# Patient Record
Sex: FEMALE | Race: WHITE | NOT HISPANIC OR LATINO | Employment: FULL TIME | URBAN - METROPOLITAN AREA
[De-identification: names, ages, dates, MRNs, and addresses within clinical notes are randomized per-mention and may not be internally consistent; named-entity substitution may affect disease eponyms.]

---

## 2018-07-26 ENCOUNTER — CLINICAL SUPPORT (OUTPATIENT)
Dept: FAMILY MEDICINE | Facility: CLINIC | Age: 18
End: 2018-07-26
Payer: COMMERCIAL

## 2018-07-26 DIAGNOSIS — Z11.1 PPD SCREENING TEST: Primary | ICD-10-CM

## 2018-07-26 PROCEDURE — 86580 TB INTRADERMAL TEST: CPT | Performed by: FAMILY MEDICINE

## 2018-10-31 ENCOUNTER — TELEPHONE (OUTPATIENT)
Dept: FAMILY MEDICINE | Facility: CLINIC | Age: 18
End: 2018-10-31

## 2019-03-21 ENCOUNTER — TELEPHONE (OUTPATIENT)
Dept: FAMILY MEDICINE | Facility: CLINIC | Age: 19
End: 2019-03-21

## 2019-03-21 NOTE — TELEPHONE ENCOUNTER
Pt calling, states Dr Gomez use to prescribe ondanestron 4mg for her for nausea due to being on cabergoline. States she hasnt needed it due to being off of this certain medication but is back on it and having the nausea again, would like to know if you can prescribe this ondanestron again for nausea. Would like sent to the [place] [id number] [address]

## 2019-03-21 NOTE — TELEPHONE ENCOUNTER
She hasn't been seen here in almost 2 years and needs an appt prior to prescription (and if she still considers me her [facility]) Either that or she could try getting the prescription for it from the prescriber of the cabergline.

## 2019-06-13 ENCOUNTER — TELEPHONE (OUTPATIENT)
Dept: FAMILY MEDICINE | Facility: CLINIC | Age: 19
End: 2019-06-13

## 2019-06-13 ENCOUNTER — OFFICE VISIT (OUTPATIENT)
Dept: FAMILY MEDICINE | Facility: CLINIC | Age: 19
End: 2019-06-13
Payer: COMMERCIAL

## 2019-06-13 VITALS
BODY MASS INDEX: 40.53 KG/M2 | HEART RATE: 89 BPM | OXYGEN SATURATION: 98 % | SYSTOLIC BLOOD PRESSURE: 110 MMHG | HEIGHT: 67 IN | TEMPERATURE: 98.4 F | DIASTOLIC BLOOD PRESSURE: 62 MMHG | WEIGHT: 258.2 LBS

## 2019-06-13 DIAGNOSIS — Z11.1 SCREENING FOR TUBERCULOSIS: ICD-10-CM

## 2019-06-13 DIAGNOSIS — Z00.00 GENERAL MEDICAL EXAM: Primary | ICD-10-CM

## 2019-06-13 DIAGNOSIS — E66.01 MORBID OBESITY WITH BODY MASS INDEX (BMI) OF 40.0 TO 44.9 IN ADULT (CMS/HCC): ICD-10-CM

## 2019-06-13 DIAGNOSIS — E22.1 HYPERPROLACTINEMIA (CMS/HCC): ICD-10-CM

## 2019-06-13 PROBLEM — L70.9 ACNE: Status: ACTIVE | Noted: 2019-06-13

## 2019-06-13 LAB
BILIRUBIN, POC: NEGATIVE
BLOOD URINE, POC: NEGATIVE
CLARITY, POC: CLEAR
COLOR, POC: YELLOW
GLUCOSE URINE, POC: NEGATIVE
KETONES, POC: NEGATIVE
LEUKOCYTE EST, POC: NEGATIVE
NITRITE, POC: NEGATIVE
PH, POC: 5
PROTEIN, POC: NEGATIVE
SPECIFIC GRAVITY, POC: 1.02

## 2019-06-13 PROCEDURE — 86580 TB INTRADERMAL TEST: CPT | Performed by: FAMILY MEDICINE

## 2019-06-13 PROCEDURE — 81002 URINALYSIS NONAUTO W/O SCOPE: CPT | Performed by: FAMILY MEDICINE

## 2019-06-13 PROCEDURE — 99395 PREV VISIT EST AGE 18-39: CPT | Mod: 25 | Performed by: FAMILY MEDICINE

## 2019-06-13 RX ORDER — CABERGOLINE 0.5 MG/1
0.25 TABLET ORAL
COMMUNITY
Start: 2018-10-19 | End: 2023-02-07 | Stop reason: ALTCHOICE

## 2019-06-13 RX ORDER — ONDANSETRON 4 MG/1
4 TABLET, FILM COATED ORAL
COMMUNITY
Start: 2017-08-09 | End: 2019-06-13 | Stop reason: SDUPTHER

## 2019-06-13 RX ORDER — ONDANSETRON 4 MG/1
4 TABLET, FILM COATED ORAL EVERY 8 HOURS PRN
Qty: 30 TABLET | Refills: 0 | Status: SHIPPED | OUTPATIENT
Start: 2019-06-13 | End: 2021-10-24

## 2019-06-13 ASSESSMENT — ENCOUNTER SYMPTOMS
HEADACHES: 1
DYSPHORIC MOOD: 0
ABDOMINAL PAIN: 0
ARTHRALGIAS: 0
SORE THROAT: 0
BRUISES/BLEEDS EASILY: 0
DYSURIA: 0
FATIGUE: 0
HEMATURIA: 0
EYE PAIN: 0
SHORTNESS OF BREATH: 0

## 2019-06-13 NOTE — TELEPHONE ENCOUNTER
Patient wanted Dr Gomez to know that     Dr Almonte [facility] was her endocrinologist    [phone]

## 2019-06-13 NOTE — ASSESSMENT & PLAN NOTE
Follows with endocrinologist ( Dr Swathi Almonte at [place]) every 6-12 months. On cabergoline

## 2019-06-13 NOTE — PROGRESS NOTES
[facility] at [facility]     Reason for visit:   Chief Complaint   Patient presents with   • Annual Exam      HPI  Feeling well overall. Eats a balanced diet. Does exercise: cardio and weight sets: tries for 5 days a  Week.  Sees endocrinologist (Dr Swathi Almonte) for hyperprolactinemia and is just starting back on cabergoline. It makes her nauseated and zofran helps. Needs zofran refill.  Sees gyn for routine exams ([facility]) last there 12/2018 or 1/2019. Will be having her first PAP later this year  She is working on weight loss: last time she went on cabergoline was able to lose 30 pounds and is planning on losing weight now that she is back on it    Not sexually active      Cintia Nieto is a 19 y.o. female      The following have been reviewed and updated as appropriate in this visit  Patient Active Problem List    Diagnosis Date Noted   • Acne 06/13/2019   • Hyperprolactinemia (CMS/HCC) ([facility]) 06/13/2019   • Morbid obesity with body mass index (BMI) of 40.0 to 44.9 in adult (CMS/[facility]) 06/13/2019     History reviewed. No pertinent past medical history.  History reviewed. No pertinent surgical history.  Social History     Social History   • Marital status: Single     Spouse name: N/A   • Number of children: N/A   • Years of education: N/A     Occupational History   • Not on file.     Social History Main Topics   • Smoking status: Never Smoker   • Smokeless tobacco: Never Used   • Alcohol use 1.2 - 1.8 oz/week     2 - 3 Shots of liquor per week      Comment: socailly -- hard seltzer    • Drug use: Unknown   • Sexual activity: Not on file     Other Topics Concern   • Not on file     Social History Narrative   • No narrative on file       Family History   Problem Relation Age of Onset   • Anxiety disorder Father    • Prostate cancer Father    • Skin cancer Father        No Known Allergies    Current Outpatient Prescriptions   Medication Sig Dispense Refill   • cabergoline (DOSTINEX) 0.5  "mg tablet Take 0.25 mg by mouth.     • ondansetron (ZOFRAN) 4 mg tablet Take 1 tablet (4 mg total) by mouth every 8 (eight) hours as needed for nausea or vomiting. 30 tablet 0     No current facility-administered medications for this visit.      Review of Systems   Constitutional: Negative for fatigue.   HENT: Positive for ear pain (occasional bilateral ear pain). Negative for sore throat.    Eyes: Negative for pain.   Respiratory: Negative for shortness of breath.    Cardiovascular: Negative for chest pain.   Gastrointestinal: Negative for abdominal pain.   Genitourinary: Negative for dysuria and hematuria.   Musculoskeletal: Negative for arthralgias.   Skin:        No changes in moles that she has noticed   Neurological: Positive for headaches (occasional. being on cabergoline helps).   Hematological: Does not bruise/bleed easily.   Psychiatric/Behavioral: Negative for dysphoric mood.        PHQ2 score=0     Objective   Vitals:    06/13/19 1316   BP: 110/62   Pulse: 89   Temp: 36.9 °C (98.4 °F)   SpO2: 98%   Weight: 117 kg (258 lb 3.2 oz)   Height: 1.702 m (5' 7\")     Body mass index is 40.44 kg/m².       Visual Acuity Screening    Right eye Left eye Both eyes   Without correction: 20/25 20/25 20/20   With correction:      Comments: (-) color blind     Physical Exam   Constitutional: She appears well-developed and well-nourished. No distress.   HENT:   Head: Normocephalic and atraumatic.   Right Ear: Tympanic membrane normal.   Left Ear: Tympanic membrane normal.   Mouth/Throat: Oropharynx is clear and moist.   External nose normal   Eyes: Pupils are equal, round, and reactive to light. EOM are normal.   Fundi benign   Neck: Neck supple. No thyromegaly present.   Cardiovascular: Normal rate and regular rhythm.  Exam reveals no gallop and no friction rub.    No murmur heard.  Pulmonary/Chest: Effort normal and breath sounds normal. She exhibits no deformity.   Breasts: no mass or axillary lymphadenopathy " bilaterally   Abdominal: Soft. Bowel sounds are normal. She exhibits no distension and no mass. There is no hepatosplenomegaly. There is no tenderness.   Musculoskeletal: She exhibits no edema.   Lymphadenopathy:     She has no cervical adenopathy.   Neurological: She is alert.   Reflex Scores:       Bicep reflexes are 2+ on the right side and 2+ on the left side.       Brachioradialis reflexes are 2+ on the right side and 2+ on the left side.       Patellar reflexes are 2+ on the right side and 2+ on the left side.  Skin: No cyanosis. Nails show no clubbing.   No suspicious lesions     Procedures       POINT OF CARE TESTING:    Results for orders placed or performed in visit on 06/13/19   POCT urinalysis dipstick   Result Value Ref Range    Color, UA Yellow     Clarity, UA Clear     Glucose, UA Negative     Bilirubin, UA Negative     Ketones, UA Negative     Spec Grav, UA 1.020     Blood, UA Negative     pH, UA 5.0     Protein, UA Negative     Leukocytes, UA Negative     Nitrite, UA Negative         Assessment   Diagnoses and all orders for this visit:    General medical exam (Primary)  Comments:  well pt. UA done in office. had lipids and other labs done by endocrinology per patient  Orders:  -     POCT urinalysis dipstick    Morbid obesity with body mass index (BMI) of 40.0 to 44.9 in adult (CMS/[facility])  Assessment & Plan:  Continue weight loss efforts. Should improve now that she is back on cabergoline      Hyperprolactinemia (CMS/HCC) ([facility])  Assessment & Plan:  Follows with endocrinologist ( Dr Swathi Almonte at [place]) every 6-12 months. On cabergoline      Screening for tuberculosis  Comments:  PPD placed. sister is a nurse (known to me) and will read it and send me documentation  Orders:  -     TB Skin Test    Other orders  -     ondansetron (ZOFRAN) 4 mg tablet; Take 1 tablet (4 mg total) by mouth every 8 (eight) hours as needed for nausea or vomiting.    She'll check on Tdap coverage     Educated patient on any  new medications/doses that I prescribed today and patient expressed understanding and patient expressed understanding of and agreement with plan  No Follow-up on file.     Anne Marie Gomez MD  6/13/2019

## 2019-07-09 ENCOUNTER — TELEPHONE (OUTPATIENT)
Dept: FAMILY MEDICINE | Facility: CLINIC | Age: 19
End: 2019-07-09

## 2019-07-09 NOTE — TELEPHONE ENCOUNTER
Called lmjenni letting pt know we received her ppd results form, and that physical form is completed. Need to know if she would like us to mail it to her or if she would like to pick it up?

## 2020-07-27 ENCOUNTER — APPOINTMENT (OUTPATIENT)
Dept: URBAN - METROPOLITAN AREA CLINIC 200 | Age: 20
Setting detail: DERMATOLOGY
End: 2020-08-04

## 2020-07-27 DIAGNOSIS — L73.2 HIDRADENITIS SUPPURATIVA: ICD-10-CM

## 2020-07-27 PROBLEM — L70.0 ACNE VULGARIS: Status: ACTIVE | Noted: 2020-07-27

## 2020-07-27 PROCEDURE — OTHER REASON FOR TELEMEDICINE VISIT: OTHER

## 2020-07-27 PROCEDURE — 99212 OFFICE O/P EST SF 10 MIN: CPT | Mod: 95

## 2020-07-27 PROCEDURE — OTHER PRESCRIPTION MEDICATION MANAGEMENT: OTHER

## 2020-07-27 PROCEDURE — OTHER CONSENT FOR TELEMEDICINE VISIT OBTAINED: OTHER

## 2020-07-27 PROCEDURE — OTHER PRESCRIPTION: OTHER

## 2020-07-27 PROCEDURE — OTHER TELEHEALTH ASSESSMENT: OTHER

## 2020-07-27 RX ORDER — DOXYCYCLINE HYCLATE 20 MG/1
TABLET, FILM COATED ORAL
Qty: 60 | Refills: 3 | Status: ERX | COMMUNITY
Start: 2020-07-27

## 2020-07-27 RX ORDER — DOXYCYCLINE HYCLATE 100 MG/1
CAPSULE, GELATIN COATED ORAL
Qty: 60 | Refills: 3 | Status: ERX | COMMUNITY
Start: 2020-07-27

## 2020-07-27 ASSESSMENT — LOCATION SIMPLE DESCRIPTION DERM
LOCATION SIMPLE: LEFT UPPER BACK
LOCATION SIMPLE: UPPER BACK
LOCATION SIMPLE: RIGHT FOREHEAD
LOCATION SIMPLE: CHEST

## 2020-07-27 ASSESSMENT — LOCATION DETAILED DESCRIPTION DERM
LOCATION DETAILED: RIGHT MEDIAL SUPERIOR CHEST
LOCATION DETAILED: RIGHT LATERAL FOREHEAD
LOCATION DETAILED: SUPERIOR THORACIC SPINE
LOCATION DETAILED: LEFT SUPERIOR MEDIAL UPPER BACK
LOCATION DETAILED: LEFT MEDIAL SUPERIOR CHEST
LOCATION DETAILED: RIGHT MEDIAL FOREHEAD

## 2020-07-27 ASSESSMENT — LOCATION ZONE DERM
LOCATION ZONE: FACE
LOCATION ZONE: TRUNK

## 2020-07-27 NOTE — PROCEDURE: PRESCRIPTION MEDICATION MANAGEMENT
Plan: Start DOXYCYCLINE 100 mg
Render In Strict Bullet Format?: No
Initiate Treatment: doxycycline hyclate 20 mg tablet \\nDOXYCYCLINE 100 mg \\nCLN WASH
Detail Level: Detailed

## 2021-10-24 ENCOUNTER — HOSPITAL ENCOUNTER (OUTPATIENT)
Facility: CLINIC | Age: 21
Discharge: HOME | End: 2021-10-24
Attending: FAMILY MEDICINE
Payer: COMMERCIAL

## 2021-10-24 VITALS
DIASTOLIC BLOOD PRESSURE: 70 MMHG | HEART RATE: 84 BPM | OXYGEN SATURATION: 100 % | TEMPERATURE: 98.4 F | SYSTOLIC BLOOD PRESSURE: 118 MMHG

## 2021-10-24 DIAGNOSIS — J02.9 VIRAL PHARYNGITIS: Primary | ICD-10-CM

## 2021-10-24 LAB — S PYO AG THROAT QL: NEGATIVE

## 2021-10-24 PROCEDURE — S9083 URGENT CARE CENTER GLOBAL: HCPCS | Performed by: FAMILY MEDICINE

## 2021-10-24 PROCEDURE — 99214 OFFICE O/P EST MOD 30 MIN: CPT | Performed by: FAMILY MEDICINE

## 2021-10-24 PROCEDURE — 87880 STREP A ASSAY W/OPTIC: CPT | Performed by: FAMILY MEDICINE

## 2021-10-24 RX ORDER — IBUPROFEN 600 MG/1
600 TABLET ORAL 3 TIMES DAILY PRN
Qty: 60 TABLET | Refills: 0 | Status: SHIPPED | OUTPATIENT
Start: 2021-10-24 | End: 2023-02-07 | Stop reason: ALTCHOICE

## 2021-10-24 RX ORDER — FLUTICASONE PROPIONATE 50 MCG
1 SPRAY, SUSPENSION (ML) NASAL DAILY
Qty: 16 G | Refills: 0 | Status: SHIPPED | OUTPATIENT
Start: 2021-10-24 | End: 2025-01-08

## 2021-10-24 ASSESSMENT — ENCOUNTER SYMPTOMS
SINUS PRESSURE: 0
SINUS PAIN: 0
CHILLS: 0
TROUBLE SWALLOWING: 0
FATIGUE: 0
SHORTNESS OF BREATH: 0
FEVER: 0
WHEEZING: 0
RHINORRHEA: 1
SORE THROAT: 1
COUGH: 0

## 2021-10-24 NOTE — ED PROVIDER NOTES
History  Chief Complaint   Patient presents with   • Sore Throat     HPI     Sore throat for 5- 7days  Has seasonal allergies - takes daily zyrtec  No fever/chills   - had a few kids out this past week - all viral non covid  Fully vaccinated for covid  Sore throat  No ear pain  Minimal post nasal drip  Has tried cough medicines to help numb the back with little relief  Ibuprofen with temporary relief  Worse at night when lying down      History reviewed. No pertinent past medical history.    History reviewed. No pertinent surgical history.    Family History   Problem Relation Age of Onset   • Anxiety disorder Biological Father    • Prostate cancer Biological Father    • Skin cancer Biological Father        Social History     Tobacco Use   • Smoking status: Never Smoker   • Smokeless tobacco: Never Used   Substance Use Topics   • Alcohol use: Yes     Alcohol/week: 2.0 - 3.0 standard drinks     Types: 2 - 3 Shots of liquor per week     Comment: socailly -- hard seltzer    • Drug use: Not on file       Review of Systems   Constitutional: Negative for chills, fatigue and fever.   HENT: Positive for congestion, postnasal drip, rhinorrhea and sore throat. Negative for ear pain, sinus pressure, sinus pain and trouble swallowing.    Respiratory: Negative for cough, shortness of breath and wheezing.    Cardiovascular: Negative for chest pain.       Physical Exam  ED Triage Vitals [10/24/21 1225]   Temp Heart Rate Resp BP SpO2   36.9 °C (98.4 °F) 84 -- 118/70 100 %      Temp src Heart Rate Source Patient Position BP Location FiO2 (%) (Set)   -- -- Sitting Right upper arm --       Physical Exam  Vitals and nursing note reviewed.   Constitutional:       General: She is not in acute distress.     Appearance: She is well-developed.   HENT:      Head: Normocephalic and atraumatic.      Right Ear: Tympanic membrane and ear canal normal. No tenderness. No middle ear effusion. Tympanic membrane is not erythematous.       Left Ear: Tympanic membrane and ear canal normal. No tenderness.  No middle ear effusion. Tympanic membrane is not erythematous.      Nose: Congestion and rhinorrhea present.      Comments: Turbinates with clear discharge and moderate enlargement and erythema  Negative tenderness to palpation over the sinuses       Mouth/Throat:      Mouth: Mucous membranes are moist.      Pharynx: No oropharyngeal exudate or posterior oropharyngeal erythema.   Eyes:      Conjunctiva/sclera: Conjunctivae normal.   Cardiovascular:      Rate and Rhythm: Normal rate and regular rhythm.      Heart sounds: No murmur heard.  No gallop.    Pulmonary:      Effort: Pulmonary effort is normal. No respiratory distress.      Breath sounds: Normal breath sounds. No wheezing.   Abdominal:      Palpations: Abdomen is soft.      Tenderness: There is no abdominal tenderness.   Musculoskeletal:      Cervical back: Neck supple.   Skin:     General: Skin is warm and dry.   Neurological:      General: No focal deficit present.      Mental Status: She is alert and oriented to person, place, and time.   Psychiatric:         Mood and Affect: Mood normal.         Behavior: Behavior normal.           Procedures  Procedures    UC Course  Clinical Impressions as of 10/24/21 1243   Viral pharyngitis       MDM    Viral pharyngitis  Cont supportive care  Cont zyrtec  Add fluticasone  Ibuprofen  Increase water and rest  Low suspicion for COVID  Strep negative             Faith Howell,   10/24/21 1244

## 2021-12-15 RX ORDER — FLUTICASONE PROPIONATE 50 MCG
SPRAY, SUSPENSION (ML) NASAL
Qty: 16 ML | OUTPATIENT
Start: 2021-12-15

## 2022-08-25 ENCOUNTER — OFFICE VISIT (OUTPATIENT)
Dept: FAMILY MEDICINE | Facility: CLINIC | Age: 22
End: 2022-08-25
Payer: COMMERCIAL

## 2022-08-25 VITALS
HEIGHT: 67 IN | WEIGHT: 278.4 LBS | SYSTOLIC BLOOD PRESSURE: 118 MMHG | DIASTOLIC BLOOD PRESSURE: 70 MMHG | BODY MASS INDEX: 43.7 KG/M2 | HEART RATE: 82 BPM | RESPIRATION RATE: 14 BRPM | OXYGEN SATURATION: 97 %

## 2022-08-25 DIAGNOSIS — Z11.1 TUBERCULOSIS SCREENING: ICD-10-CM

## 2022-08-25 DIAGNOSIS — Z23 ENCOUNTER FOR IMMUNIZATION: ICD-10-CM

## 2022-08-25 DIAGNOSIS — Z00.00 ANNUAL PHYSICAL EXAM: Primary | ICD-10-CM

## 2022-08-25 PROBLEM — E22.1 HYPERPROLACTINEMIA (CMS/HCC): Status: RESOLVED | Noted: 2019-06-13 | Resolved: 2022-08-25

## 2022-08-25 PROCEDURE — 99395 PREV VISIT EST AGE 18-39: CPT | Mod: 25 | Performed by: NURSE PRACTITIONER

## 2022-08-25 PROCEDURE — 90471 IMMUNIZATION ADMIN: CPT | Performed by: NURSE PRACTITIONER

## 2022-08-25 PROCEDURE — 90715 TDAP VACCINE 7 YRS/> IM: CPT | Performed by: NURSE PRACTITIONER

## 2022-08-25 PROCEDURE — 86580 TB INTRADERMAL TEST: CPT | Performed by: NURSE PRACTITIONER

## 2022-08-25 PROCEDURE — 3008F BODY MASS INDEX DOCD: CPT | Performed by: NURSE PRACTITIONER

## 2022-08-25 ASSESSMENT — ENCOUNTER SYMPTOMS
FEVER: 0
CARDIOVASCULAR NEGATIVE: 1
CONSTIPATION: 0
EYE REDNESS: 0
SORE THROAT: 0
SEIZURES: 0
MYALGIAS: 0
RESPIRATORY NEGATIVE: 1
EYE DISCHARGE: 0
CONFUSION: 0
DYSURIA: 0
FREQUENCY: 0
PALPITATIONS: 0
NEUROLOGICAL NEGATIVE: 1
MUSCULOSKELETAL NEGATIVE: 1
EYES NEGATIVE: 1
CONSTITUTIONAL NEGATIVE: 1
DIARRHEA: 0
DIZZINESS: 0
GASTROINTESTINAL NEGATIVE: 1
WHEEZING: 0
APPETITE CHANGE: 0
SHORTNESS OF BREATH: 0
FLANK PAIN: 0
NAUSEA: 0
RHINORRHEA: 0
ALLERGIC/IMMUNOLOGIC NEGATIVE: 1
NECK PAIN: 0
FATIGUE: 0
PSYCHIATRIC NEGATIVE: 1
CHEST TIGHTNESS: 0
ABDOMINAL PAIN: 0
ENDOCRINE NEGATIVE: 1
ADENOPATHY: 0
HEMATOLOGIC/LYMPHATIC NEGATIVE: 1
HEADACHES: 0
POLYDIPSIA: 0
ARTHRALGIAS: 0
COUGH: 0
POLYPHAGIA: 0

## 2022-08-25 NOTE — PROGRESS NOTES
[facility] at [facility]     Reason for visit:   Chief Complaint   Patient presents with   • Re-establish care     Physical       HPI  Cintia Nieto is a 22 y.o. female is re-establishing care. LOV June 2019    Health Maintenance    Last Pap: due   Most Recent Labs: not recent   Covid: J&J - no booster   Last Tetanus/Tdap: 8/24/2009  Last eye exam: 2 yrs ago   Last dentist appt: every six months   Diet: eats healthy with lots of fruit and veges   Exercise: walks about 4x/ week     Offering no problems and concerns.       Review of Systems   Constitutional: Negative.  Negative for appetite change, fatigue and fever.   HENT: Negative.  Negative for congestion, ear pain, rhinorrhea and sore throat.    Eyes: Negative.  Negative for discharge, redness and visual disturbance.   Respiratory: Negative.  Negative for cough, chest tightness, shortness of breath and wheezing.    Cardiovascular: Negative.  Negative for chest pain, palpitations and leg swelling.   Gastrointestinal: Negative.  Negative for abdominal pain, constipation, diarrhea and nausea.   Endocrine: Negative.  Negative for polydipsia, polyphagia and polyuria.   Genitourinary: Negative.  Negative for dysuria, flank pain, frequency and urgency.   Musculoskeletal: Negative.  Negative for arthralgias, myalgias and neck pain.   Skin: Negative.  Negative for pallor and rash.   Allergic/Immunologic: Negative.  Negative for immunocompromised state.   Neurological: Negative.  Negative for dizziness, seizures and headaches.   Hematological: Negative.  Negative for adenopathy.   Psychiatric/Behavioral: Negative.  Negative for behavioral problems and confusion.        Patient Active Problem List   Diagnosis   • Acne   • Morbid obesity with body mass index (BMI) of 40.0 to 44.9 in adult (CMS/[facility])         History reviewed. No pertinent past medical history.  History reviewed. No pertinent surgical history.  Social History     Socioeconomic History  "  • Marital status: Single     Spouse name: Not on file   • Number of children: Not on file   • Years of education: Not on file   • Highest education level: Not on file   Occupational History   • Not on file   Tobacco Use   • Smoking status: Never Smoker   • Smokeless tobacco: Never Used   Substance and Sexual Activity   • Alcohol use: Yes     Alcohol/week: 2.0 - 3.0 standard drinks     Types: 2 - 3 Shots of liquor per week     Comment: socailly -- hard seltzer    • Drug use: Not on file   • Sexual activity: Not on file   Other Topics Concern   • Not on file   Social History Narrative   • Not on file     Social Determinants of Health     Financial Resource Strain: Not on file   Food Insecurity: Not on file   Transportation Needs: Not on file   Physical Activity: Not on file   Stress: Not on file   Social Connections: Not on file   Intimate Partner Violence: Not on file   Housing Stability: Not on file     Family History   Problem Relation Age of Onset   • Anxiety disorder Biological Father    • Prostate cancer Biological Father    • Skin cancer Biological Father    • Ventricular tachycardia Biological Sister        Allergies:  Patient has no known allergies.    Current Outpatient Medications   Medication Sig Dispense Refill   • cabergoline (DOSTINEX) 0.5 mg tablet Take 0.25 mg by mouth.     • fluticasone propionate 50 mcg/actuation nasal spray Administer 1 spray into each nostril daily. (Patient not taking: Reported on 8/25/2022) 16 g 0   • ibuprofen 600 mg tablet Take 1 tablet (600 mg total) by mouth 3 (three) times a day as needed for moderate pain for up to 10 days. Take with food 60 tablet 0     No current facility-administered medications for this visit.        Objective   Vitals:    08/25/22 1605   BP: 118/70   Pulse: 82   Resp: 14   SpO2: 97%   Weight: 126 kg (278 lb 6.4 oz)   Height: 1.702 m (5' 7\")     Ht Readings from Last 3 Encounters:   08/25/22 1.702 m (5' 7\")   06/13/19 1.702 m (5' 7\")     Wt Readings " from Last 3 Encounters:   08/25/22 126 kg (278 lb 6.4 oz)   06/13/19 117 kg (258 lb 3.2 oz)     Body mass index is 43.6 kg/m².     Hearing Screening    125Hz 250Hz 500Hz 1000Hz 2000Hz 3000Hz 4000Hz 6000Hz 8000Hz   Right ear:            Left ear:               Visual Acuity Screening    Right eye Left eye Both eyes   Without correction: 20/20 20/25 20/15   With correction:      Comments: - color blind       Physical Exam  Constitutional:       Appearance: Normal appearance. She is well-developed.   HENT:      Head: Normocephalic.      Right Ear: Hearing, tympanic membrane, ear canal and external ear normal.      Left Ear: Hearing, tympanic membrane, ear canal and external ear normal.   Eyes:      General: Lids are normal.      Conjunctiva/sclera: Conjunctivae normal.      Pupils: Pupils are equal, round, and reactive to light.   Neck:      Thyroid: No thyroid mass or thyromegaly.      Vascular: No carotid bruit.      Trachea: Trachea normal.   Cardiovascular:      Rate and Rhythm: Normal rate and regular rhythm.      Pulses:           Radial pulses are 2+ on the right side and 2+ on the left side.        Dorsalis pedis pulses are 2+ on the right side and 2+ on the left side.      Heart sounds: Normal heart sounds, S1 normal and S2 normal.   Pulmonary:      Effort: Pulmonary effort is normal.      Breath sounds: Normal breath sounds.   Abdominal:      General: Bowel sounds are normal.      Palpations: Abdomen is soft.      Tenderness: There is no abdominal tenderness.   Musculoskeletal:         General: Normal range of motion.      Cervical back: Normal range of motion.   Lymphadenopathy:      Cervical: No cervical adenopathy.   Skin:     General: Skin is warm and dry.   Neurological:      Mental Status: She is alert and oriented to person, place, and time.      GCS: GCS eye subscore is 4. GCS verbal subscore is 5. GCS motor subscore is 6.      Cranial Nerves: No cranial nerve deficit.      Sensory: No sensory  deficit.   Psychiatric:         Speech: Speech normal.         Behavior: Behavior normal. Behavior is cooperative.              Point of Care Testing Results  No results found for this visit on 08/25/22.     Assessment   Diagnoses and all orders for this visit:    Annual physical exam (Primary)    Encounter for immunization  -     Tdap vaccine greater than or equal to 6yo IM    Tuberculosis screening  -     TB Skin Test    Stable annual wellness  Recommend follow up with gyn as scheduled  Recommend routine eye and dental care         Patient has been educated on the risks, benefits, and side effects of all medication prescribed at this visit.  Patient expressed understanding and agreement with plan.  Return in about 1 year (around 8/25/2023).  ARNULFO Villarreal  8/25/2022       There are no Patient Instructions on file for this visit.

## 2022-08-26 ENCOUNTER — TELEPHONE (OUTPATIENT)
Dept: FAMILY MEDICINE | Facility: CLINIC | Age: 22
End: 2022-08-26

## 2022-08-26 NOTE — TELEPHONE ENCOUNTER
Pt's mother called asking for clarification on $80 NPV charge, as she believed it would be covered by ins. Please call pt's mother as able. Thank you.

## 2023-02-07 ENCOUNTER — OFFICE VISIT (OUTPATIENT)
Dept: FAMILY MEDICINE | Facility: CLINIC | Age: 23
End: 2023-02-07
Payer: COMMERCIAL

## 2023-02-07 VITALS
BODY MASS INDEX: 43.17 KG/M2 | HEART RATE: 103 BPM | WEIGHT: 275.6 LBS | DIASTOLIC BLOOD PRESSURE: 78 MMHG | RESPIRATION RATE: 15 BRPM | SYSTOLIC BLOOD PRESSURE: 120 MMHG | OXYGEN SATURATION: 98 %

## 2023-02-07 DIAGNOSIS — B35.4 TINEA CORPORIS: ICD-10-CM

## 2023-02-07 DIAGNOSIS — D35.2 PITUITARY ADENOMA (CMS/HCC): ICD-10-CM

## 2023-02-07 DIAGNOSIS — R51.9 HEADACHE, UNSPECIFIED HEADACHE TYPE: Primary | ICD-10-CM

## 2023-02-07 PROCEDURE — 99214 OFFICE O/P EST MOD 30 MIN: CPT | Performed by: FAMILY MEDICINE

## 2023-02-07 PROCEDURE — 3008F BODY MASS INDEX DOCD: CPT | Performed by: FAMILY MEDICINE

## 2023-02-07 RX ORDER — MELOXICAM 15 MG/1
15 TABLET ORAL DAILY
Qty: 15 TABLET | Refills: 0 | Status: SHIPPED | OUTPATIENT
Start: 2023-02-07 | End: 2025-01-08

## 2023-02-07 RX ORDER — NYSTATIN 100000 [USP'U]/G
POWDER TOPICAL 2 TIMES DAILY
Qty: 60 G | Refills: 1 | Status: SHIPPED | OUTPATIENT
Start: 2023-02-07 | End: 2023-03-09

## 2023-02-07 ASSESSMENT — ENCOUNTER SYMPTOMS
SHORTNESS OF BREATH: 0
NECK PAIN: 1
FEVER: 0
HEADACHES: 1
ABDOMINAL PAIN: 0

## 2023-02-07 NOTE — PROGRESS NOTES
[facility] at [facility]     Reason for visit:   Chief Complaint   Patient presents with   • Migraine     Patient states for the past two weeks, concentrated on the left side, but also dull through the whole head.   • Rash     Under bi-lateral breast, believes its a yeast infection.      HPI  She has had headaches for years but for the past 2 weeks she has had a different kind of headaches. She gets a left sided headache shooting pain from the left occiput up over the top of her head. Her headache is worse with bright light. The shooting pain is intermittent the headache is constant but waxes and waned in severity.  No known tick bites    Yesterday she developed a rash under both breasts. It doesn't itch but it is uncomfortable.     No chance pregnant    Cintia Nieto is a 22 y.o. female      The following have been reviewed and updated as appropriate in this visit  Patient Active Problem List    Diagnosis Date Noted   • Pituitary adenoma (CMS/[facility]) 02/07/2023   • Acne 06/13/2019   • Morbid obesity with body mass index (BMI) of 40.0 to 44.9 in adult (CMS/[facility]) 06/13/2019     History reviewed. No pertinent past medical history.  History reviewed. No pertinent surgical history.  Social History     Socioeconomic History   • Marital status: Single     Spouse name: Not on file   • Number of children: Not on file   • Years of education: Not on file   • Highest education level: Not on file   Occupational History   • Not on file   Tobacco Use   • Smoking status: Never   • Smokeless tobacco: Never   Substance and Sexual Activity   • Alcohol use: Yes     Alcohol/week: 2.0 - 3.0 standard drinks     Types: 2 - 3 Shots of liquor per week     Comment: socailly -- hard seltzer    • Drug use: Not on file   • Sexual activity: Not on file   Other Topics Concern   • Not on file   Social History Narrative   • Not on file     Social Determinants of Health     Financial Resource Strain: Not on file   Food Insecurity:  Not on file   Transportation Needs: Not on file   Physical Activity: Not on file   Stress: Not on file   Social Connections: Not on file   Intimate Partner Violence: Not on file   Housing Stability: Not on file       Family History   Problem Relation Age of Onset   • Anxiety disorder Biological Father    • Prostate cancer Biological Father    • Skin cancer Biological Father    • Ventricular tachycardia Biological Sister        No Known Allergies    Current Outpatient Medications   Medication Sig Dispense Refill   • fluticasone propionate 50 mcg/actuation nasal spray Administer 1 spray into each nostril daily. 16 g 0   • meloxicam (MOBIC) 15 mg tablet Take 1 tablet (15 mg total) by mouth daily for 15 days. with food 15 tablet 0   • nystatin (MYCOSTATIN) 100,000 unit/gram powder Apply topically 2 (two) times a day. 60 g 1     No current facility-administered medications for this visit.     Review of Systems   Constitutional: Negative for fever.   Respiratory: Negative for shortness of breath.    Cardiovascular: Negative for chest pain.        Occasional palpitations near menses. No lightheadedness   Gastrointestinal: Negative for abdominal pain.   Genitourinary: Negative for decreased urine volume.   Musculoskeletal: Positive for neck pain.   Skin: Positive for rash.   Neurological: Positive for headaches.     Objective   Vitals:    02/07/23 1435   BP: 120/78   BP Location: Left upper arm   Patient Position: Sitting   Pulse: (!) 103   Resp: 15   SpO2: 98%   Weight: 125 kg (275 lb 9.6 oz)     Body mass index is 43.17 kg/m².    Physical Exam  Constitutional:       General: She is not in acute distress.     Appearance: She is well-developed.   HENT:      Head: Normocephalic and atraumatic.      Right Ear: Tympanic membrane and ear canal normal.      Left Ear: Tympanic membrane and ear canal normal.   Eyes:      General: Lids are normal.      Extraocular Movements: Extraocular movements intact.      Conjunctiva/sclera:  Conjunctivae normal.      Pupils: Pupils are equal, round, and reactive to light.      Comments: Fundi benign   Neck:      Thyroid: No thyromegaly.   Cardiovascular:      Rate and Rhythm: Normal rate and regular rhythm.      Heart sounds: Normal heart sounds. No murmur heard.    No friction rub. No gallop.   Pulmonary:      Effort: Pulmonary effort is normal.      Breath sounds: Normal breath sounds.   Abdominal:      Palpations: Abdomen is soft.      Tenderness: There is no abdominal tenderness.   Musculoskeletal:      Right lower leg: No edema.      Left lower leg: No edema.   Lymphadenopathy:      Cervical: No cervical adenopathy.   Skin:     Coloration: Skin is not cyanotic.      Nails: There is no clubbing.      Comments: Moist erythematous rash under both breasts in skin folds   Neurological:      Mental Status: She is alert.      Comments: Cranial nerves II-XI grossly intact       Procedures       POINT OF CARE TESTING:    No results found for this visit on 02/07/23.     Assessment   Diagnoses and all orders for this visit:    Headache, unspecified headache type (Primary)  Comments:  check labs as below. Meloxicam 15 mg once a day  Orders:  -     CBC and Differential; Future  -     Comprehensive metabolic panel; Future  -     Lyme Disease Abs, Immunoblot; Future    Tinea corporis  Comments:  nystatin powder as below    Pituitary adenoma (CMS/HCC)  Assessment & Plan:  She hasn't seen endocrinology in some time. Check prolactin level    Orders:  -     Prolactin; Future    Other orders  -     meloxicam (MOBIC) 15 mg tablet; Take 1 tablet (15 mg total) by mouth daily for 15 days. with food  -     nystatin (MYCOSTATIN) 100,000 unit/gram powder; Apply topically 2 (two) times a day.       Educated patient on any new medications/doses that I prescribed today and patient expressed understanding and patient expressed understanding of and agreement with plan  No follow-ups on file.     Anne Marie Gomez MD  2/7/2023

## 2024-06-18 ENCOUNTER — TELEPHONE (OUTPATIENT)
Dept: FAMILY MEDICINE | Facility: CLINIC | Age: 24
End: 2024-06-18
Payer: COMMERCIAL

## 2024-06-18 NOTE — TELEPHONE ENCOUNTER
Per our records, pt overdue for EPP and cervical CA screening.     Spoke with pt and advised. Pt states her GYN office closed and she's booked with a new office for Sept. She states she will cb to schedule her EPP with Dr. Gomez.

## 2024-09-03 ENCOUNTER — TELEPHONE (OUTPATIENT)
Dept: FAMILY MEDICINE | Facility: CLINIC | Age: 24
End: 2024-09-03
Payer: COMMERCIAL

## 2024-10-31 ENCOUNTER — PATIENT OUTREACH (OUTPATIENT)
Dept: FAMILY MEDICINE | Facility: CLINIC | Age: 24
End: 2024-10-31
Payer: COMMERCIAL

## 2024-10-31 NOTE — PROGRESS NOTES
Care Gap Team has outreached to Cintia Nieto on behalf of their primary care provider.      Care Gap Source: [facility]    Care Gap(s) Identified: Cervical Cancer Screening    Care Gap Status: Due    Outreach via: Telephone    Adult Preventive Wellness Protocol(s) Used: Cervical Cancer Screening    Chart Review Completed: Yes  Patient interview completed: No  Inclusion Criteria: Met  Exclusion Criteria: None  Patient educated on recommended care: No    Order or Clinical Referral: None          Appointment provided: No                Called and left VM for patient letting them know that they are due for their cervical cancer screening. Msg in the summer states that pt was finding a new ob/gyn and scheduled for Sept. I am trying to see where pt went to to have screening completed.  Asked patient to return my call.

## 2025-01-08 ENCOUNTER — HOSPITAL ENCOUNTER (EMERGENCY)
Facility: HOSPITAL | Age: 25
Discharge: HOME | End: 2025-01-08
Attending: EMERGENCY MEDICINE | Admitting: EMERGENCY MEDICINE
Payer: COMMERCIAL

## 2025-01-08 ENCOUNTER — APPOINTMENT (EMERGENCY)
Dept: RADIOLOGY | Facility: HOSPITAL | Age: 25
End: 2025-01-08
Payer: COMMERCIAL

## 2025-01-08 VITALS
HEIGHT: 68 IN | HEART RATE: 102 BPM | TEMPERATURE: 96.8 F | BODY MASS INDEX: 44.41 KG/M2 | OXYGEN SATURATION: 100 % | WEIGHT: 293 LBS | SYSTOLIC BLOOD PRESSURE: 133 MMHG | RESPIRATION RATE: 18 BRPM | DIASTOLIC BLOOD PRESSURE: 83 MMHG

## 2025-01-08 DIAGNOSIS — R51.9 ACUTE NONINTRACTABLE HEADACHE, UNSPECIFIED HEADACHE TYPE: Primary | ICD-10-CM

## 2025-01-08 DIAGNOSIS — R42 DIZZINESS: ICD-10-CM

## 2025-01-08 LAB
ALBUMIN SERPL-MCNC: 4.5 G/DL (ref 3.5–5.7)
ALP SERPL-CCNC: 56 IU/L (ref 34–125)
ALT SERPL-CCNC: 19 IU/L (ref 7–52)
ANION GAP SERPL CALC-SCNC: 8 MEQ/L (ref 3–15)
AST SERPL-CCNC: 15 IU/L (ref 13–39)
BACTERIA URNS QL MICRO: ABNORMAL /HPF
BASOPHILS # BLD: 0.03 K/UL (ref 0.01–0.1)
BASOPHILS NFR BLD: 0.4 %
BILIRUB SERPL-MCNC: 0.5 MG/DL (ref 0.3–1.2)
BILIRUB UR QL STRIP.AUTO: NEGATIVE MG/DL
BUN SERPL-MCNC: 11 MG/DL (ref 7–25)
CALCIUM SERPL-MCNC: 9.9 MG/DL (ref 8.6–10.3)
CHLORIDE SERPL-SCNC: 100 MEQ/L (ref 98–107)
CLARITY UR REFRACT.AUTO: CLEAR
CO2 SERPL-SCNC: 28 MEQ/L (ref 21–31)
COLOR UR AUTO: YELLOW
CREAT SERPL-MCNC: 0.8 MG/DL (ref 0.6–1.2)
DIFFERENTIAL METHOD BLD: NORMAL
EGFRCR SERPLBLD CKD-EPI 2021: >60 ML/MIN/1.73M*2
EOSINOPHIL # BLD: 0.06 K/UL (ref 0.04–0.36)
EOSINOPHIL NFR BLD: 0.9 %
ERYTHROCYTE [DISTWIDTH] IN BLOOD BY AUTOMATED COUNT: 12.6 % (ref 11.7–14.4)
FLUAV RNA SPEC QL NAA+PROBE: NEGATIVE
FLUBV RNA SPEC QL NAA+PROBE: NEGATIVE
GLUCOSE SERPL-MCNC: 105 MG/DL (ref 70–99)
GLUCOSE UR STRIP.AUTO-MCNC: NEGATIVE MG/DL
HCG UR QL: NEGATIVE
HCT VFR BLD AUTO: 42.5 % (ref 35–45)
HGB BLD-MCNC: 14.3 G/DL (ref 11.8–15.7)
HGB UR QL STRIP.AUTO: NEGATIVE
HYALINE CASTS #/AREA URNS LPF: ABNORMAL /LPF
IMM GRANULOCYTES # BLD AUTO: 0.02 K/UL (ref 0–0.08)
IMM GRANULOCYTES NFR BLD AUTO: 0.3 %
KETONES UR STRIP.AUTO-MCNC: NEGATIVE MG/DL
LEUKOCYTE ESTERASE UR QL STRIP.AUTO: NEGATIVE
LYMPHOCYTES # BLD: 1.41 K/UL (ref 1.2–3.5)
LYMPHOCYTES NFR BLD: 20.5 %
MCH RBC QN AUTO: 29.2 PG (ref 28–33.2)
MCHC RBC AUTO-ENTMCNC: 33.6 G/DL (ref 32.2–35.5)
MCV RBC AUTO: 86.7 FL (ref 83–98)
MONOCYTES # BLD: 0.42 K/UL (ref 0.28–0.8)
MONOCYTES NFR BLD: 6.1 %
MUCOUS THREADS URNS QL MICRO: ABNORMAL /LPF
NEUTROPHILS # BLD: 4.95 K/UL (ref 1.7–7)
NEUTS SEG NFR BLD: 71.8 %
NITRITE UR QL STRIP.AUTO: NEGATIVE
NRBC BLD-RTO: 0 %
PH UR STRIP.AUTO: 7 [PH]
PLATELET # BLD AUTO: 255 K/UL (ref 150–369)
PMV BLD AUTO: 9.8 FL (ref 9.4–12.3)
POTASSIUM SERPL-SCNC: 3.8 MEQ/L (ref 3.5–5.1)
PROT SERPL-MCNC: 8.3 G/DL (ref 6–8.2)
PROT UR QL STRIP.AUTO: ABNORMAL
RBC # BLD AUTO: 4.9 M/UL (ref 3.93–5.22)
RBC #/AREA URNS HPF: ABNORMAL /HPF
RSV RNA SPEC QL NAA+PROBE: NEGATIVE
SARS-COV-2 RNA RESP QL NAA+PROBE: NEGATIVE
SODIUM SERPL-SCNC: 136 MEQ/L (ref 136–145)
SP GR UR REFRACT.AUTO: 1.02
SQUAMOUS URNS QL MICRO: ABNORMAL /HPF
UROBILINOGEN UR STRIP-ACNC: 0.2 EU/DL
WBC # BLD AUTO: 6.89 K/UL (ref 3.8–10.5)
WBC #/AREA URNS HPF: ABNORMAL /HPF

## 2025-01-08 PROCEDURE — 36415 COLL VENOUS BLD VENIPUNCTURE: CPT | Performed by: EMERGENCY MEDICINE

## 2025-01-08 PROCEDURE — 99284 EMERGENCY DEPT VISIT MOD MDM: CPT | Mod: 25

## 2025-01-08 PROCEDURE — 87637 SARSCOV2&INF A&B&RSV AMP PRB: CPT | Performed by: EMERGENCY MEDICINE

## 2025-01-08 PROCEDURE — 80053 COMPREHEN METABOLIC PANEL: CPT | Performed by: EMERGENCY MEDICINE

## 2025-01-08 PROCEDURE — 81001 URINALYSIS AUTO W/SCOPE: CPT | Performed by: EMERGENCY MEDICINE

## 2025-01-08 PROCEDURE — 70450 CT HEAD/BRAIN W/O DYE: CPT

## 2025-01-08 PROCEDURE — 84703 CHORIONIC GONADOTROPIN ASSAY: CPT | Performed by: EMERGENCY MEDICINE

## 2025-01-08 PROCEDURE — 85025 COMPLETE CBC W/AUTO DIFF WBC: CPT | Performed by: EMERGENCY MEDICINE

## 2025-01-08 RX ORDER — MECLIZINE HCL 25MG 25 MG/1
25 TABLET, CHEWABLE ORAL 3 TIMES DAILY PRN
Qty: 20 TABLET | Refills: 0 | Status: SHIPPED | OUTPATIENT
Start: 2025-01-08 | End: 2025-03-31

## 2025-01-08 RX ORDER — BUTALBITAL, ACETAMINOPHEN AND CAFFEINE 50; 325; 40 MG/1; MG/1; MG/1
1 TABLET ORAL EVERY 4 HOURS PRN
Qty: 12 TABLET | Refills: 0 | Status: SHIPPED | OUTPATIENT
Start: 2025-01-08 | End: 2025-03-31

## 2025-01-08 ASSESSMENT — ENCOUNTER SYMPTOMS
CHEST TIGHTNESS: 0
VOMITING: 0
FLANK PAIN: 0
FEVER: 0
MYALGIAS: 1
ABDOMINAL PAIN: 0
FATIGUE: 0
DYSURIA: 0
COUGH: 0
NAUSEA: 1
SHORTNESS OF BREATH: 0
WEAKNESS: 0
HEADACHES: 1
NECK PAIN: 1
SEIZURES: 0

## 2025-01-08 NOTE — Clinical Note
Cintia Nieto was seen and treated in our emergency department on 1/8/2025.  Cintia Nieto may return to work on 01/10/2025.       If you have any questions or concerns, please don't hesitate to call.      Nora Ugalde PA

## 2025-01-08 NOTE — DISCHARGE INSTRUCTIONS
Your workup is largely reassuring.  You tested negative for flu, COVID, RSV.  Take Antivert as prescribed to help with the dizziness.  You were also prescribed Fioricet to take for the headache as needed.  Follow-up with your family doctor, your endocrinologist and with neurology if you have continued headaches.  Return to the emergency department for any worsening symptoms

## 2025-01-08 NOTE — ED PROVIDER NOTES
Emergency Medicine Note  HPI   HISTORY OF PRESENT ILLNESS     24-year-old female with a history of PCOS and a pituitary adenoma who presents to the emergency department with multiple complaints.  She states for the past 3 weeks she has had intermittent headaches, neck pain, back pain, clogged ears, nausea.  She states she has a history of headaches so she has been taking Excedrin which does seem to help but then her headache returns.  She states her sister is a nurse and told her that she should not be taking too much aspirin so she stopped.  She states she feels dizzy and lightheaded from time to time and today she had some paresthesias to the hands which is what prompted her to come to the emergency department.  She denies any numbness or tingling at this time.  She denies chest pain or shortness of breath.  She denies chance of pregnancy.  She denies abdominal pain or vomiting.  She states she took ibuprofen prior to arrival and her symptoms are mild at this time.      History provided by:  Patient   used: No    Headache  Pain location:  Generalized  Quality:  Dull  Radiates to:  L neck and R neck  Onset quality:  Gradual  Duration:  3 weeks  Timing:  Constant  Progression:  Waxing and waning  Chronicity:  New  Associated symptoms: myalgias, nausea and neck pain    Associated symptoms: no abdominal pain, no congestion, no cough, no fatigue, no fever, no seizures, no vomiting and no weakness    Neck Pain  Associated symptoms: headaches    Associated symptoms: no chest pain, no fever and no weakness          Patient History   PAST HISTORY     Reviewed from Nursing Triage:       Past Medical History:   Diagnosis Date    PCOS (polycystic ovarian syndrome)        No past surgical history on file.    Family History   Problem Relation Name Age of Onset    Anxiety disorder Biological Father      Prostate cancer Biological Father      Skin cancer Biological Father      Ventricular tachycardia  Biological Sister         Social History     Tobacco Use    Smoking status: Never    Smokeless tobacco: Never   Substance Use Topics    Alcohol use: Yes     Alcohol/week: 2.0 - 3.0 standard drinks of alcohol     Types: 2 - 3 Shots of liquor per week     Comment: socailly -- hard traci          Review of Systems   REVIEW OF SYSTEMS     Review of Systems   Constitutional:  Negative for fatigue and fever.   HENT:  Negative for congestion.    Respiratory:  Negative for cough, chest tightness and shortness of breath.    Cardiovascular:  Negative for chest pain.   Gastrointestinal:  Positive for nausea. Negative for abdominal pain and vomiting.   Genitourinary:  Negative for dysuria and flank pain.   Musculoskeletal:  Positive for myalgias and neck pain.   Neurological:  Positive for headaches. Negative for seizures and weakness.   All other systems reviewed and are negative.        VITALS     ED Vitals      Date/Time Temp Pulse Resp BP SpO2 [facility]   01/08/25 1038 36 °C (96.8 °F) 102 18 133/83 100 % MMM          Pulse Ox %: 100 % (01/08/25 1124)  Pulse Ox Interpretation: Normal (01/08/25 1124)           Physical Exam   PHYSICAL EXAM     Physical Exam  Vitals and nursing note reviewed.   Constitutional:       General: She is not in acute distress.     Appearance: Normal appearance. She is not ill-appearing, toxic-appearing or diaphoretic.   HENT:      Head: Normocephalic and atraumatic.      Right Ear: Tympanic membrane, ear canal and external ear normal.      Left Ear: Tympanic membrane, ear canal and external ear normal.      Nose: Nose normal.      Mouth/Throat:      Mouth: Mucous membranes are moist.      Pharynx: Oropharynx is clear. No oropharyngeal exudate or posterior oropharyngeal erythema.   Eyes:      Extraocular Movements: Extraocular movements intact.      Pupils: Pupils are equal, round, and reactive to light.   Cardiovascular:      Rate and Rhythm: Regular rhythm. Tachycardia present.      Pulses: Normal  pulses.      Heart sounds: Normal heart sounds.   Pulmonary:      Effort: Pulmonary effort is normal.      Breath sounds: Normal breath sounds.   Musculoskeletal:         General: Normal range of motion.      Cervical back: Normal range of motion and neck supple. No rigidity or tenderness.   Skin:     General: Skin is warm and dry.      Capillary Refill: Capillary refill takes less than 2 seconds.   Neurological:      General: No focal deficit present.      Mental Status: She is alert and oriented to person, place, and time.   Psychiatric:         Mood and Affect: Mood normal.           PROCEDURES     Procedures     DATA     Results       Procedure Component Value Units Date/Time    Comprehensive metabolic panel [[id number]]  (Abnormal) Collected: 01/08/25 1039    Specimen: Blood, Venous Updated: 01/08/25 1200     Sodium 136 mEQ/L      Potassium 3.8 mEQ/L      Comment: Results obtained on plasma. Plasma Potassium values may be up to 0.4 mEQ/L less than serum values. The differences may be greater for patients with high platelet or white cell counts.        Chloride 100 mEQ/L      CO2 28 mEQ/L      BUN 11 mg/dL      Creatinine 0.8 mg/dL      Glucose 105 mg/dL      Calcium 9.9 mg/dL      AST (SGOT) 15 IU/L      ALT (SGPT) 19 IU/L      Alkaline Phosphatase 56 IU/L      Total Protein 8.3 g/dL      Comment: Test performed on plasma which typically contains approximately 0.4 g/dL more protein than serum.        Albumin 4.5 g/dL      Bilirubin, Total 0.5 mg/dL      eGFR >60.0 mL/min/1.73m*2      Comment: Calculation based on the Chronic Kidney Disease Epidemiology Collaboration (CKD-EPI) equation refit without adjustment for race.        Anion Gap 8 mEQ/L     BhCG, Serum, Qual (if menstruating female and no urine) [[id number]]  (Normal) Collected: 01/08/25 1039    Specimen: Blood, Venous Updated: 01/08/25 1154     Preg Test, Serum Negative    Urinalysis with Reflex Culture [[id number]]  (Abnormal) Collected: 01/08/25 1125     Specimen: Urine, Clean Catch Updated: 01/08/25 1139    Narrative:      The following orders were created for panel order Urinalysis with Reflex Culture.  Procedure                               Abnormality         Status                     ---------                               -----------         ------                     UA Reflex to Culture (Ma...[[id number]]  Abnormal            Final result               UA Microscopic[[id number]]               Abnormal            Final result                 Please view results for these tests on the individual orders.    UA Microscopic [[id number]]  (Abnormal) Collected: 01/08/25 1125    Specimen: Urine, Clean Catch Updated: 01/08/25 1139     RBC, Urine 0 TO 4 /HPF      WBC, Urine 0 TO 3 /HPF      Squamous Epithelial Rare /hpf      Hyaline Cast None Seen /lpf      Bacteria, Urine Rare /HPF      Mucus Rare /LPF     UA Reflex to Culture (Macroscopic) [[id number]]  (Abnormal) Collected: 01/08/25 1125    Specimen: Urine, Clean Catch Updated: 01/08/25 1137     Color, Urine Yellow     Clarity, Urine Clear     Specific Gravity, Urine 1.020     pH, Urine 7.0     Leukocyte Esterase Negative     Comment: Results can be falsely negative due to high specific gravity, some antibiotics, glucose >3 g/dl, or WBC other than neutrophils.        Nitrite, Urine Negative     Protein, Urine Trace     Comment: Trace False Positive Protein can be seen with alkaline or highly buffered urines or urine with high specific gravity. Suggest clinical correlation.        Glucose, Urine Negative mg/dL      Ketones, Urine Negative mg/dL      Urobilinogen, Urine 0.2 EU/dL      Bilirubin, Urine Negative mg/dL      Blood, Urine Negative     Comment: The sensitivity of the occult blood test is equivalent to approximately 4 intact RBC/HPF.       CBC and differential [[id number]] Collected: 01/08/25 1039    Specimen: Blood, Venous Updated: 01/08/25 1137     WBC 6.89 K/uL      RBC 4.90 M/uL      Hemoglobin 14.3 g/dL       Hematocrit 42.5 %      MCV 86.7 fL      MCH 29.2 pg      MCHC 33.6 g/dL      RDW 12.6 %      Platelets 255 K/uL      MPV 9.8 fL      Differential Type Auto     nRBC 0.0 %      Immature Granulocytes 0.3 %      Neutrophils 71.8 %      Lymphocytes 20.5 %      Monocytes 6.1 %      Eosinophils 0.9 %      Basophils 0.4 %      Immature Granulocytes, Absolute 0.02 K/uL      Neutrophils, Absolute 4.95 K/uL      Lymphocytes, Absolute 1.41 K/uL      Monocytes, Absolute 0.42 K/uL      Eosinophils, Absolute 0.06 K/uL      Basophils, Absolute 0.03 K/uL     SARS-COV-2 (COVID-19)/ FLU A/B, AND RSV, PCR Nasopharynx [[id number]]  (Normal) Collected: 01/08/25 1045    Specimen: Nasopharyngeal Swab from Nasopharynx Updated: 01/08/25 1133     SARS-CoV-2 (COVID-19) Negative     Influenza A Negative     Influenza B Negative     Respiratory Syncytial Virus Negative    Narrative:      Testing performed using real-time PCR for detection of COVID-19. EUA approved validation studies performed on site.     RAINBOW LT BLUE [[id number]] Collected: 01/08/25 1116    Specimen: Blood, Venous Updated: 01/08/25 1133    [facility] Draw Panel [[id number]] Collected: 01/08/25 1116    Specimen: Blood, Venous Updated: 01/08/25 1132    Narrative:      The following orders were created for panel order [facility] Draw Panel.  Procedure                               Abnormality         Status                     ---------                               -----------         ------                     RAINBOW LT BLUE[[id number]]                                  In process                 RAINBOW LT GREEN[[id number]]                                 In process                   Please view results for these tests on the individual orders.    RAINBOW LT GREEN [[id number]] Collected: 01/08/25 1116    Specimen: Blood, Venous Updated: 01/08/25 1132            Imaging Results              CT HEAD WITHOUT IV CONTRAST (Final result)  Result time 01/08/25 12:22:26      Final result                    Impression:    IMPRESSION:  1. No acute calvarial fracture.  2. No acute intracranial findings.  3. Incidental miniscule sternum magna measuring up to 14 mm which is unchanged  from 2017.                 Narrative:    CT HEAD WITHOUT CONTRAST    CLINICAL HISTORY: dizzy    COMPARISON: MR brain 5/25/2017    TECHNIQUE: Helical CT of the head was performed from the skull apex through the  foramen magnum without intravenous contrast. MPR (coronal and sagittal) images  were generated and reviewed.    Dose: One or more dose reduction techniques (including automated exposure  control, adjustment of the mA and/or kV according to patient size, and iterative  reconstruction technique) were utilized for this examination.    FINDINGS:  Brain parenchyma: Preserved gray-white matter differentiation. No midline shift.    Ventricles: Age-appropriate. Incidental miniscule sternum magna measuring up to  14 mm which is unchanged from 2017.    Hemorrhage and/or collections: None.    Visualized orbits: Intact.    Paranasal sinuses and mastoid air cells: Clear.    Skull: No acute calvarial fracture.    Soft tissues: Unremarkable.                                      No orders to display       Scoring tools                                  ED Course & MDM   MDM / ED COURSE / CLINICAL IMPRESSION / DISPO     Medical Decision Making  24-year-old female with a history of PCOS and pituitary adenoma who presents to the emergency department with complaints of 3 weeks of not feeling well with intermittent headaches as well as dizziness which gets worse upon standing and movement of her head.  She describes pressure in her ears and the dizzy sensation.  Her workup here is largely reassuring.  CT head showing no concerning findings.  It is showing an incidental unchanged cisterna magna measuring up to 14 mm.  I do not feel that this is related to patient's symptoms.  She was negative for flu, COVID, RSV.  She is in no distress in  the emergency department and well-appearing.  Will discharge home with Antivert and Fioricet as needed for the headache.  Follow-up with her family doctor and her endocrinologist for the history of the pituitary adenoma.  Follow-up with the neurology if the headaches persist and follow-up with the family doctor.  Return to the ER for worsening symptoms.  Patient and mother comfortable with plan peer    Problems Addressed:  Acute nonintractable headache, unspecified headache type: acute illness or injury  Dizziness: acute illness or injury    Amount and/or Complexity of Data Reviewed  Labs: ordered. Decision-making details documented in ED Course.  Radiology: ordered. Decision-making details documented in ED Course.    Risk  OTC drugs.  Prescription drug management.        ED Course as of 01/08/25 1440   Wed Jan 08, 2025   1133 SARS-CoV-2 (COVID-19): Negative [CB]   1133 Influenza A: Negative [CB]   1133 Influenza B: Negative [CB]   1133 Respiratory Syncytial Virus: Negative [CB]   1158 Preg Test, Serum: Negative [CB]   1227 CT HEAD WITHOUT IV CONTRAST  --  IMPRESSION:  1. No acute calvarial fracture.  2. No acute intracranial findings.  3. Incidental miniscule sternum magna measuring up to 14 mm which is unchanged  from 2017.   [CB]   1318 WBC: 6.89 [CB]   1319 Hemoglobin: 14.3 [CB]   1319 Platelets: 255 [CB]   1319 BUN: 11 [CB]   1319 Creatinine: 0.8 [CB]      ED Course User Index  [CB] Nora Ugalde PA     Clinical Impression      Acute nonintractable headache, unspecified headache type   Dizziness     _________________       ED Disposition   Discharge                       Nora Ugalde PA  01/08/25 1440

## 2025-01-09 ENCOUNTER — TELEPHONE (OUTPATIENT)
Dept: FAMILY MEDICINE | Facility: CLINIC | Age: 25
End: 2025-01-09
Payer: COMMERCIAL

## 2025-01-09 NOTE — ED ATTESTATION NOTE
I, Dr. Calvo, was personally available for consultation in the Emergency Department. I have reviewed and agree with the assessment, treatment plan and disposition of the patient as recorded by the MLP.            Ann Calvo MD  01/08/25 9432

## 2025-01-13 ENCOUNTER — OFFICE VISIT (OUTPATIENT)
Dept: FAMILY MEDICINE | Facility: CLINIC | Age: 25
End: 2025-01-13
Payer: COMMERCIAL

## 2025-01-13 VITALS
DIASTOLIC BLOOD PRESSURE: 82 MMHG | OXYGEN SATURATION: 97 % | TEMPERATURE: 98.2 F | RESPIRATION RATE: 13 BRPM | SYSTOLIC BLOOD PRESSURE: 118 MMHG | HEART RATE: 74 BPM

## 2025-01-13 DIAGNOSIS — S16.1XXA STRAIN OF NECK MUSCLE, INITIAL ENCOUNTER: ICD-10-CM

## 2025-01-13 DIAGNOSIS — Z00.00 ROUTINE HEALTH MAINTENANCE: ICD-10-CM

## 2025-01-13 DIAGNOSIS — R42 VERTIGO: ICD-10-CM

## 2025-01-13 DIAGNOSIS — J01.40 ACUTE NON-RECURRENT PANSINUSITIS: Primary | ICD-10-CM

## 2025-01-13 DIAGNOSIS — D35.2 PITUITARY ADENOMA (CMS/HCC): ICD-10-CM

## 2025-01-13 DIAGNOSIS — E53.8 VITAMIN B12 DEFICIENCY: ICD-10-CM

## 2025-01-13 PROCEDURE — 99214 OFFICE O/P EST MOD 30 MIN: CPT | Performed by: NURSE PRACTITIONER

## 2025-01-13 RX ORDER — AMOXICILLIN AND CLAVULANATE POTASSIUM 875; 125 MG/1; MG/1
1 TABLET, FILM COATED ORAL 2 TIMES DAILY
Qty: 20 TABLET | Refills: 0 | Status: SHIPPED | OUTPATIENT
Start: 2025-01-13 | End: 2025-01-23

## 2025-01-13 RX ORDER — NAPROXEN 500 MG/1
500 TABLET ORAL 2 TIMES DAILY WITH MEALS
Qty: 30 TABLET | Refills: 0 | Status: SHIPPED | OUTPATIENT
Start: 2025-01-13 | End: 2025-03-31

## 2025-01-13 RX ORDER — CYCLOBENZAPRINE HCL 10 MG
10 TABLET ORAL 3 TIMES DAILY PRN
Qty: 15 TABLET | Refills: 0 | Status: SHIPPED | OUTPATIENT
Start: 2025-01-13 | End: 2025-03-31

## 2025-01-13 ASSESSMENT — PATIENT HEALTH QUESTIONNAIRE - PHQ9: SUM OF ALL RESPONSES TO PHQ9 QUESTIONS 1 & 2: 0

## 2025-01-13 NOTE — PROGRESS NOTES
[facility] at [facility]     Reason for visit:   Chief Complaint   Patient presents with    Hospital Discharge Follow-up     Headaches      HPI  Cintia Nieto is a 24 y.o. female    Went to ER on 1/8 c/o intermittent headaches, neck pain, back pain, clogged ears, nausea for 3 weeks and were increasing in intensity. Also having dizziness. Headache felt like  a lot of pressure.  Had to leave work mid-day.  She states she has a history of headaches so she has been taking Excedrin which does seem to help but then her headache returns.      She states her sister is a nurse and told her that she should not be taking too much aspirin so she stopped.  She had some paresthesias to the hands which is what prompted her to go to the ER.  CT Head was negative. Was given rx for Fioricet and meclizine and they haven't helped.       Pressure in head and dizziness when turning head/neck.    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        Review of Systems   Constitutional: Negative.  Negative for appetite change, fatigue and fever.   HENT:  Positive for sinus pressure and sinus pain. Negative for congestion, ear pain, rhinorrhea and sore throat.    Eyes: Negative.  Negative for discharge, redness and visual disturbance.   Respiratory: Negative.  Negative for cough, chest tightness, shortness of breath and wheezing.    Cardiovascular: Negative.  Negative for chest pain, palpitations and leg swelling.   Gastrointestinal: Negative.  Negative for abdominal pain, constipation, diarrhea and nausea.   Endocrine: Negative.  Negative for polydipsia, polyphagia and polyuria.   Genitourinary: Negative.  Negative for dysuria, flank pain, frequency and urgency.   Musculoskeletal:  Positive for neck pain. Negative for arthralgias and myalgias.   Skin: Negative.  Negative for pallor and rash.   Allergic/Immunologic: Negative.  Negative for immunocompromised state.   Neurological:  Positive for dizziness  and headaches. Negative for seizures.   Hematological: Negative.  Negative for adenopathy.   Psychiatric/Behavioral: Negative.  Negative for behavioral problems and confusion.         Patient Active Problem List   Diagnosis    Acne    Morbid obesity with body mass index (BMI) of 40.0 to 44.9 in adult (CMS/[facility])    Pituitary adenoma (CMS/[facility])    Acute non-recurrent pansinusitis    Cervical strain    Vertigo         Past Medical History:   Diagnosis Date    PCOS (polycystic ovarian syndrome)      No past surgical history on file.  Social History     Socioeconomic History    Marital status: Single     Spouse name: Not on file    Number of children: Not on file    Years of education: Not on file    Highest education level: Not on file   Occupational History    Not on file   Tobacco Use    Smoking status: Never    Smokeless tobacco: Never   Substance and Sexual Activity    Alcohol use: Yes     Alcohol/week: 2.0 - 3.0 standard drinks of alcohol     Types: 2 - 3 Shots of liquor per week     Comment: socailly -- hard seltzer     Drug use: Not on file    Sexual activity: Not on file   Other Topics Concern    Not on file   Social History Narrative    Not on file     Social Drivers of Health     Financial Resource Strain: Not on file   Food Insecurity: No Food Insecurity (1/8/2025)    Hunger Vital Sign     Worried About Running Out of Food in the Last Year: Never true     Ran Out of Food in the Last Year: Never true   Transportation Needs: Not on file   Physical Activity: Not on file   Stress: Not on file   Social Connections: Not on file   Intimate Partner Violence: Not on file   Housing Stability: Not on file     Family History   Problem Relation Name Age of Onset    Anxiety disorder Biological Father      Prostate cancer Biological Father      Skin cancer Biological Father      Ventricular tachycardia Biological Sister         Allergies:  Patient has no known allergies.    Current Outpatient Medications   Medication Sig  "Dispense Refill    amoxicillin-pot clavulanate (AUGMENTIN) 875-125 mg per tablet Take 1 tablet by mouth 2 (two) times a day for 10 days. 20 tablet 0    butalbital-acetaminophen-caff (FIORICET, ESGIC) -40 mg per tablet Take 1 tablet by mouth every 4 (four) hours as needed for headaches for up to 12 doses. 12 tablet 0    cyclobenzaprine (FLEXERIL) 10 mg tablet Take 1 tablet (10 mg total) by mouth 3 (three) times a day as needed for muscle spasms for up to 14 days. 15 tablet 0    meclizine (ANTIVERT) 25 mg tablet Take 1 tablet (25 mg total) by mouth 3 (three) times a day as needed for dizziness. 20 tablet 0    naproxen (NAPROSYN) 500 mg tablet Take 1 tablet (500 mg total) by mouth 2 (two) times a day with meals. 30 tablet 0     No current facility-administered medications for this visit.        Objective   Vitals:    01/13/25 1542   BP: 118/82   Pulse: 74   Resp: 13   Temp: 36.8 °C (98.2 °F)   SpO2: 97%     Ht Readings from Last 3 Encounters:   01/08/25 1.727 m (5' 8\")   08/25/22 1.702 m (5' 7\")   06/13/19 1.702 m (5' 7\")     Wt Readings from Last 3 Encounters:   01/08/25 136 kg (300 lb)   02/07/23 125 kg (275 lb 9.6 oz)   08/25/22 126 kg (278 lb 6.4 oz)     There is no height or weight on file to calculate BMI.    Physical Exam  Constitutional:       Appearance: Normal appearance. She is well-developed.   HENT:      Head: Normocephalic.      Right Ear: Hearing normal. Tympanic membrane is bulging.      Left Ear: Hearing normal. Tympanic membrane is bulging.      Nose: Mucosal edema present.      Right Sinus: Maxillary sinus tenderness and frontal sinus tenderness present.      Left Sinus: Maxillary sinus tenderness and frontal sinus tenderness present.      Mouth/Throat:      Pharynx: Posterior oropharyngeal erythema present.   Eyes:      Conjunctiva/sclera: Conjunctivae normal.      Pupils: Pupils are equal, round, and reactive to light.   Cardiovascular:      Rate and Rhythm: Normal rate and regular rhythm.    "   Heart sounds: Normal heart sounds.   Pulmonary:      Effort: Pulmonary effort is normal.      Breath sounds: Normal breath sounds.   Abdominal:      General: Bowel sounds are normal.      Palpations: Abdomen is soft.      Tenderness: There is no abdominal tenderness.   Musculoskeletal:      Cervical back: Spasms present. Pain with movement present.   Lymphadenopathy:      Cervical: Cervical adenopathy present.   Skin:     General: Skin is warm and dry.   Neurological:      Mental Status: She is alert and oriented to person, place, and time.      Motor: Motor function is intact.      Coordination: Coordination is intact.   Psychiatric:         Speech: Speech normal.         Point of Care Testing Results  No results found for this visit on 01/13/25.     Assessment   Diagnoses and all orders for this visit:    Acute non-recurrent pansinusitis (Primary)  Assessment & Plan:  CT done in the ER showed that her sinuses were clear, however today she is showing a sinus infection.  Explained to patient that this is not the cause of her headache and dizziness, but it is not helping her either.  Take antibiotic as directed until completed - can take with probiotic  Recommend nasal steroid spray daily, saline rinses as needed  Mucinex, Tylenol or Advil as needed for HA  Breathe warm, moist air from air from a steamy shower, a hot bath, or a sink filled with hot water.       Orders:  -     amoxicillin-pot clavulanate (AUGMENTIN) 875-125 mg per tablet; Take 1 tablet by mouth 2 (two) times a day for 10 days.    Pituitary adenoma (CMS/[facility])  Assessment & Plan:  Lab slip given to check prolactin level.  Will call with results.    Orders:  -     Prolactin; Future    Strain of neck muscle, initial encounter  Assessment & Plan:  Instructed to rest the strained muscle.  Instructed to use heat to relax muscle.   Instructed to take NSAIDs with food to relieve pain.  Instructed to take muscle relaxer at bedtime.  Do not take during the day  when driving.  Do not do anything that makes the pain worse.  Return to exercise gradually.  Prescription for the physical therapy given.    Orders:  -     naproxen (NAPROSYN) 500 mg tablet; Take 1 tablet (500 mg total) by mouth 2 (two) times a day with meals.  -     cyclobenzaprine (FLEXERIL) 10 mg tablet; Take 1 tablet (10 mg total) by mouth 3 (three) times a day as needed for muscle spasms for up to 14 days.  -     Ambulatory referral to Physical Therapy; Future    Vertigo  Assessment & Plan:  Prescription for physical therapy given.  If vertigo continues, patient to follow-up with ENT.    Orders:  -     Ambulatory referral to Physical Therapy; Future    Routine health maintenance  -     CBC and Differential; Future  -     Comprehensive metabolic panel; Future  -     Lipid panel; Future  -     TSH 3rd Generation; Future    Vitamin B12 deficiency  -     Vitamin B12; Future          Patient has been educated on the risks, benefits, and side effects of all medication prescribed at this visit, and will contact me with any further questions.  Patient expressed understanding and agreement with plan.  Return if symptoms worsen or fail to improve.    ARNULFO Villarreal  1/14/2025       There are no Patient Instructions on file for this visit.

## 2025-01-14 PROBLEM — J01.40 ACUTE NON-RECURRENT PANSINUSITIS: Status: ACTIVE | Noted: 2025-01-14

## 2025-01-14 PROBLEM — R42 VERTIGO: Status: ACTIVE | Noted: 2025-01-14

## 2025-01-14 PROBLEM — S16.1XXA CERVICAL STRAIN: Status: ACTIVE | Noted: 2025-01-14

## 2025-01-14 ASSESSMENT — ENCOUNTER SYMPTOMS
POLYDIPSIA: 0
CONSTITUTIONAL NEGATIVE: 1
ARTHRALGIAS: 0
FEVER: 0
EYE REDNESS: 0
PSYCHIATRIC NEGATIVE: 1
WHEEZING: 0
GASTROINTESTINAL NEGATIVE: 1
RHINORRHEA: 0
FATIGUE: 0
EYES NEGATIVE: 1
CONSTIPATION: 0
APPETITE CHANGE: 0
SORE THROAT: 0
HEADACHES: 1
SINUS PRESSURE: 1
DYSURIA: 0
HEMATOLOGIC/LYMPHATIC NEGATIVE: 1
POLYPHAGIA: 0
COUGH: 0
ENDOCRINE NEGATIVE: 1
ADENOPATHY: 0
PALPITATIONS: 0
NAUSEA: 0
SINUS PAIN: 1
FREQUENCY: 0
CARDIOVASCULAR NEGATIVE: 1
DIZZINESS: 1
SHORTNESS OF BREATH: 0
EYE DISCHARGE: 0
DIARRHEA: 0
ALLERGIC/IMMUNOLOGIC NEGATIVE: 1
ABDOMINAL PAIN: 0
MYALGIAS: 0
RESPIRATORY NEGATIVE: 1
SEIZURES: 0
CHEST TIGHTNESS: 0
CONFUSION: 0
FLANK PAIN: 0
NECK PAIN: 1

## 2025-01-14 NOTE — ASSESSMENT & PLAN NOTE
CT done in the ER showed that her sinuses were clear, however today she is showing a sinus infection.  Explained to patient that this is not the cause of her headache and dizziness, but it is not helping her either.  Take antibiotic as directed until completed - can take with probiotic  Recommend nasal steroid spray daily, saline rinses as needed  Mucinex, Tylenol or Advil as needed for HA  Breathe warm, moist air from air from a steamy shower, a hot bath, or a sink filled with hot water.

## 2025-01-14 NOTE — ASSESSMENT & PLAN NOTE
Instructed to rest the strained muscle.  Instructed to use heat to relax muscle.   Instructed to take NSAIDs with food to relieve pain.  Instructed to take muscle relaxer at bedtime.  Do not take during the day when driving.  Do not do anything that makes the pain worse.  Return to exercise gradually.  Prescription for the physical therapy given.   No

## 2025-01-27 NOTE — PROGRESS NOTES
[facility] at [facility]     Reason for visit:   Chief Complaint   Patient presents with    Annual Exam      HPI  Cintia Nieto is a 24 y.o. female    Health Maintenance    Last Pap: Dr Escobedo   Most Recent Labs: was given lab slip on 1/13 - had them done this AM   Flu Vaccine: declines   Last Tetanus/Tdap: 2022  Last eye exam: within the year   Last dentist appt: every 6 months   Diet: eats healthy   Exercise: at home   Sexual Activity: yes   Partners: male   STD Screening:  declines     Continues to have dizziness and headaches.  Admits to not taking allergy meds.  Needs referral for ENT & Neuro.     C/o Anxiety that is occurring on a daily basis.  Thinks it may be contributing to her headaches.  Has never taken anything for anxiety.  Denies hx anxiety.  Job is stressful and is causing the anxiety.  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 There is a family history of anxiety.  Past year anxiety has gotten worse and affecting ADLs.         Review of Systems   Constitutional: Negative.  Negative for appetite change, fatigue and fever.   HENT: Negative.  Negative for congestion, ear pain, rhinorrhea and sore throat.    Eyes: Negative.  Negative for discharge, redness and visual disturbance.   Respiratory: Negative.  Negative for cough, chest tightness, shortness of breath and wheezing.    Cardiovascular: Negative.  Negative for chest pain, palpitations and leg swelling.   Gastrointestinal: Negative.  Negative for abdominal pain, constipation, diarrhea and nausea.   Endocrine: Negative.  Negative for polydipsia, polyphagia and polyuria.   Genitourinary: Negative.  Negative for dysuria, flank pain, frequency and urgency.   Musculoskeletal: Negative.  Negative for arthralgias, myalgias and neck pain.   Skin: Negative.  Negative for pallor and rash.   Allergic/Immunologic: Negative.  Negative for immunocompromised state.   Neurological:  Positive for headaches. Negative for dizziness and seizures.    Hematological: Negative.  Negative for adenopathy.   Psychiatric/Behavioral:  Negative for behavioral problems, confusion and suicidal ideas. The patient is nervous/anxious.         Patient Active Problem List   Diagnosis    Acne    Morbid obesity with body mass index (BMI) of 40.0 to 44.9 in adult (CMS/[facility])    Pituitary adenoma (CMS/[facility])    Acute non-recurrent pansinusitis    Cervical strain    Vertigo    Anxiety         Past Medical History:   Diagnosis Date    PCOS (polycystic ovarian syndrome)      No past surgical history on file.  Social History     Socioeconomic History    Marital status: Single     Spouse name: Not on file    Number of children: Not on file    Years of education: Not on file    Highest education level: Not on file   Occupational History    Not on file   Tobacco Use    Smoking status: Never    Smokeless tobacco: Never   Substance and Sexual Activity    Alcohol use: Yes     Alcohol/week: 2.0 - 3.0 standard drinks of alcohol     Types: 2 - 3 Shots of liquor per week     Comment: socailly -- hard seltzer     Drug use: Not on file    Sexual activity: Not on file   Other Topics Concern    Not on file   Social History Narrative    Not on file     Social Drivers of Health     Financial Resource Strain: Not on file   Food Insecurity: No Food Insecurity (1/8/2025)    Hunger Vital Sign     Worried About Running Out of Food in the Last Year: Never true     Ran Out of Food in the Last Year: Never true   Transportation Needs: Not on file   Physical Activity: Not on file   Stress: Not on file   Social Connections: Not on file   Intimate Partner Violence: Not on file   Housing Stability: Not on file     Family History   Problem Relation Name Age of Onset    Anxiety disorder Biological Father      Prostate cancer Biological Father      Skin cancer Biological Father      Ventricular tachycardia Biological Sister         Allergies:  Patient has no known allergies.    Current Outpatient Medications  "  Medication Sig Dispense Refill    butalbital-acetaminophen-caff (FIORICET, ESGIC) -40 mg per tablet Take 1 tablet by mouth every 4 (four) hours as needed for headaches for up to 12 doses. 12 tablet 0    cyclobenzaprine (FLEXERIL) 10 mg tablet Take 1 tablet (10 mg total) by mouth 3 (three) times a day as needed for muscle spasms for up to 14 days. 15 tablet 0    escitalopram (LEXAPRO) 5 mg tablet Take 1 tablet (5 mg total) by mouth daily. 90 tablet 0    meclizine (ANTIVERT) 25 mg tablet Take 1 tablet (25 mg total) by mouth 3 (three) times a day as needed for dizziness. 20 tablet 0    naproxen (NAPROSYN) 500 mg tablet Take 1 tablet (500 mg total) by mouth 2 (two) times a day with meals. 30 tablet 0     No current facility-administered medications for this visit.        Objective   Vitals:    01/28/25 1734   BP: 118/80   Pulse: (!) 104   Resp: 12   Temp: 36.2 °C (97.2 °F)   SpO2: 97%   Weight: 136 kg (299 lb)   Height: 1.676 m (5' 6\")     Ht Readings from Last 3 Encounters:   01/28/25 1.676 m (5' 6\")   01/08/25 1.727 m (5' 8\")   08/25/22 1.702 m (5' 7\")     Wt Readings from Last 3 Encounters:   01/28/25 136 kg (299 lb)   01/08/25 136 kg (300 lb)   02/07/23 125 kg (275 lb 9.6 oz)     Body mass index is 48.26 kg/m².    Vision Screening    Right eye Left eye Both eyes   Without correction 20/25 20/25 20/20   With correction      Comments: Neg color blind      Physical Exam  Constitutional:       Appearance: Normal appearance. She is well-developed.   HENT:      Head: Normocephalic.      Right Ear: Hearing, ear canal and external ear normal. Tympanic membrane is bulging. Tympanic membrane is not erythematous.      Left Ear: Hearing, ear canal and external ear normal. Tympanic membrane is bulging. Tympanic membrane is not erythematous.   Eyes:      General: Lids are normal.      Conjunctiva/sclera: Conjunctivae normal.      Pupils: Pupils are equal, round, and reactive to light.   Neck:      Thyroid: No thyroid mass " or thyromegaly.      Vascular: No carotid bruit.      Trachea: Trachea normal.   Cardiovascular:      Rate and Rhythm: Normal rate and regular rhythm.      Pulses:           Radial pulses are 2+ on the right side and 2+ on the left side.        Dorsalis pedis pulses are 2+ on the right side and 2+ on the left side.      Heart sounds: Normal heart sounds, S1 normal and S2 normal.   Pulmonary:      Effort: Pulmonary effort is normal.      Breath sounds: Normal breath sounds.   Abdominal:      General: Bowel sounds are normal.      Palpations: Abdomen is soft.      Tenderness: There is no abdominal tenderness.   Musculoskeletal:         General: Normal range of motion.      Cervical back: Normal range of motion.   Lymphadenopathy:      Cervical: No cervical adenopathy.   Skin:     General: Skin is warm and dry.   Neurological:      Mental Status: She is alert and oriented to person, place, and time.      GCS: GCS eye subscore is 4. GCS verbal subscore is 5. GCS motor subscore is 6.      Cranial Nerves: No cranial nerve deficit.      Sensory: No sensory deficit.   Psychiatric:         Mood and Affect: Affect normal. Mood is anxious.         Speech: Speech normal.         Behavior: Behavior normal. Behavior is cooperative.         Thought Content: Thought content normal. Thought content does not include homicidal or suicidal ideation. Thought content does not include homicidal or suicidal plan.         Judgment: Judgment normal.          Lab Results   Component Value Date    WBC 6.89 01/08/2025    HGB 14.3 01/08/2025    HCT 42.5 01/08/2025     01/08/2025     01/08/2025    K 3.8 01/08/2025     01/08/2025    BUN 11 01/08/2025    CREATININE 0.8 01/08/2025    CO2 28 01/08/2025    ALT 19 01/08/2025    AST 15 01/08/2025    CHOL 147 08/22/2016    TRIG 111 08/22/2016    HDL 40 (L) 08/22/2016    LDLCALC 85 08/22/2016    HGBA1C 4.9 08/22/2016       Point of Care Testing Results  No results found for this visit  on 01/28/25.     Assessment   Diagnoses and all orders for this visit:    Annual physical exam (Primary)    Vertigo  Assessment & Plan:  Patient instructed to take allergy medication to decrease the amount of fluid in your ears.  Also given info for ENT and neuro to make an appointment for evaluation.    Orders:  -     Ambulatory referral to ENT; Future  -     Ambulatory referral to Neurology; Future    Anxiety  Assessment & Plan:  Will start pt on lexapro. Pt to follow up in 4 weeks to eval effectiveness.  Pt instructed to seek immediate medical attention if she develops any thoughts of suicide.      Orders:  -     escitalopram (LEXAPRO) 5 mg tablet; Take 1 tablet (5 mg total) by mouth daily.      Stable annual wellness  Recommend follow up with gyn as scheduled  Recommend routine eye and dental care   Fasting lab work - will call with results        Patient has been educated on the risks, benefits, and side effects of all medication prescribed at this visit.  Patient expressed understanding and agreement with plan.  Return in about 6 months (around 7/28/2025).  ARNULFO Villarreal  1/28/2025       There are no Patient Instructions on file for this visit.

## 2025-01-28 ENCOUNTER — OFFICE VISIT (OUTPATIENT)
Dept: FAMILY MEDICINE | Facility: CLINIC | Age: 25
End: 2025-01-28
Payer: COMMERCIAL

## 2025-01-28 VITALS
HEART RATE: 104 BPM | BODY MASS INDEX: 47.09 KG/M2 | HEIGHT: 66 IN | WEIGHT: 293 LBS | OXYGEN SATURATION: 97 % | DIASTOLIC BLOOD PRESSURE: 80 MMHG | RESPIRATION RATE: 12 BRPM | SYSTOLIC BLOOD PRESSURE: 118 MMHG | TEMPERATURE: 97.2 F

## 2025-01-28 DIAGNOSIS — R42 VERTIGO: ICD-10-CM

## 2025-01-28 DIAGNOSIS — F41.9 ANXIETY: ICD-10-CM

## 2025-01-28 DIAGNOSIS — Z00.00 ANNUAL PHYSICAL EXAM: Primary | ICD-10-CM

## 2025-01-28 PROCEDURE — 99395 PREV VISIT EST AGE 18-39: CPT | Performed by: NURSE PRACTITIONER

## 2025-01-28 PROCEDURE — 3008F BODY MASS INDEX DOCD: CPT | Performed by: NURSE PRACTITIONER

## 2025-01-28 RX ORDER — ESCITALOPRAM OXALATE 5 MG/1
5 TABLET ORAL DAILY
Qty: 90 TABLET | Refills: 0 | Status: SHIPPED | OUTPATIENT
Start: 2025-01-28 | End: 2025-03-31

## 2025-01-28 ASSESSMENT — ENCOUNTER SYMPTOMS
POLYPHAGIA: 0
WHEEZING: 0
MUSCULOSKELETAL NEGATIVE: 1
NECK PAIN: 0
NERVOUS/ANXIOUS: 1
POLYDIPSIA: 0
EYE DISCHARGE: 0
NAUSEA: 0
GASTROINTESTINAL NEGATIVE: 1
SEIZURES: 0
ALLERGIC/IMMUNOLOGIC NEGATIVE: 1
CONSTIPATION: 0
SHORTNESS OF BREATH: 0
FEVER: 0
CONFUSION: 0
ABDOMINAL PAIN: 0
HEADACHES: 1
FREQUENCY: 0
RHINORRHEA: 0
FLANK PAIN: 0
ADENOPATHY: 0
DIZZINESS: 0
RESPIRATORY NEGATIVE: 1
SORE THROAT: 0
HEMATOLOGIC/LYMPHATIC NEGATIVE: 1
MYALGIAS: 0
CHEST TIGHTNESS: 0
FATIGUE: 0
EYE REDNESS: 0
CONSTITUTIONAL NEGATIVE: 1
CARDIOVASCULAR NEGATIVE: 1
EYES NEGATIVE: 1
ENDOCRINE NEGATIVE: 1
DIARRHEA: 0
COUGH: 0
DYSURIA: 0
ARTHRALGIAS: 0
PALPITATIONS: 0
APPETITE CHANGE: 0

## 2025-01-29 LAB
ALBUMIN SERPL-MCNC: 4.1 G/DL (ref 4–5)
ALP SERPL-CCNC: 61 IU/L (ref 44–121)
ALT SERPL-CCNC: 19 IU/L (ref 0–32)
AST SERPL-CCNC: 13 IU/L (ref 0–40)
BASOPHILS # BLD AUTO: 0 X10E3/UL (ref 0–0.2)
BASOPHILS NFR BLD AUTO: 1 %
BILIRUB SERPL-MCNC: 0.3 MG/DL (ref 0–1.2)
BUN SERPL-MCNC: 13 MG/DL (ref 6–20)
BUN/CREAT SERPL: 17 (ref 9–23)
CALCIUM SERPL-MCNC: 9.4 MG/DL (ref 8.7–10.2)
CHLORIDE SERPL-SCNC: 104 MMOL/L (ref 96–106)
CHOLEST SERPL-MCNC: 168 MG/DL (ref 100–199)
CO2 SERPL-SCNC: 19 MMOL/L (ref 20–29)
CREAT SERPL-MCNC: 0.77 MG/DL (ref 0.57–1)
EGFRCR SERPLBLD CKD-EPI 2021: 109 ML/MIN/1.73
EOSINOPHIL # BLD AUTO: 0.1 X10E3/UL (ref 0–0.4)
EOSINOPHIL NFR BLD AUTO: 1 %
ERYTHROCYTE [DISTWIDTH] IN BLOOD BY AUTOMATED COUNT: 12.4 % (ref 11.7–15.4)
GLOBULIN SER CALC-MCNC: 2.8 G/DL (ref 1.5–4.5)
GLUCOSE SERPL-MCNC: 94 MG/DL (ref 70–99)
HCT VFR BLD AUTO: 41.2 % (ref 34–46.6)
HDLC SERPL-MCNC: 42 MG/DL
HGB BLD-MCNC: 13.7 G/DL (ref 11.1–15.9)
IMM GRANULOCYTES # BLD AUTO: 0 X10E3/UL (ref 0–0.1)
IMM GRANULOCYTES NFR BLD AUTO: 0 %
LDLC SERPL CALC-MCNC: 108 MG/DL (ref 0–99)
LYMPHOCYTES # BLD AUTO: 2.2 X10E3/UL (ref 0.7–3.1)
LYMPHOCYTES NFR BLD AUTO: 28 %
MCH RBC QN AUTO: 29.8 PG (ref 26.6–33)
MCHC RBC AUTO-ENTMCNC: 33.3 G/DL (ref 31.5–35.7)
MCV RBC AUTO: 90 FL (ref 79–97)
MONOCYTES # BLD AUTO: 0.5 X10E3/UL (ref 0.1–0.9)
MONOCYTES NFR BLD AUTO: 7 %
NEUTROPHILS # BLD AUTO: 4.9 X10E3/UL (ref 1.4–7)
NEUTROPHILS NFR BLD AUTO: 63 %
PLATELET # BLD AUTO: 240 X10E3/UL (ref 150–450)
POTASSIUM SERPL-SCNC: 4.2 MMOL/L (ref 3.5–5.2)
PROLACTIN SERPL-MCNC: 47.2 NG/ML (ref 4.8–33.4)
PROT SERPL-MCNC: 6.9 G/DL (ref 6–8.5)
RBC # BLD AUTO: 4.59 X10E6/UL (ref 3.77–5.28)
SODIUM SERPL-SCNC: 137 MMOL/L (ref 134–144)
TRIGL SERPL-MCNC: 96 MG/DL (ref 0–149)
TSH SERPL DL<=0.005 MIU/L-ACNC: 2.42 UIU/ML (ref 0.45–4.5)
VLDLC SERPL CALC-MCNC: 18 MG/DL (ref 5–40)
WBC # BLD AUTO: 7.8 X10E3/UL (ref 3.4–10.8)

## 2025-01-29 NOTE — ASSESSMENT & PLAN NOTE
Will start pt on lexapro. Pt to follow up in 4 weeks to eval effectiveness.  Pt instructed to seek immediate medical attention if she develops any thoughts of suicide.

## 2025-01-29 NOTE — ASSESSMENT & PLAN NOTE
Patient instructed to take allergy medication to decrease the amount of fluid in your ears.  Also given info for ENT and neuro to make an appointment for evaluation.

## 2025-01-30 DIAGNOSIS — R79.89 ELEVATED PROLACTIN LEVEL: Primary | ICD-10-CM

## 2025-01-30 LAB — VIT B12 SERPL-MCNC: 335 PG/ML (ref 232–1245)

## 2025-03-08 ENCOUNTER — HOSPITAL ENCOUNTER (OUTPATIENT)
Dept: RADIOLOGY | Facility: HOSPITAL | Age: 25
Discharge: HOME | End: 2025-03-08
Attending: NURSE PRACTITIONER
Payer: COMMERCIAL

## 2025-03-08 DIAGNOSIS — R79.89 ELEVATED PROLACTIN LEVEL: ICD-10-CM

## 2025-03-08 RX ORDER — GADOBUTROL 604.72 MG/ML
0.1 INJECTION INTRAVENOUS ONCE
Status: COMPLETED | OUTPATIENT
Start: 2025-03-08 | End: 2025-03-08

## 2025-03-08 RX ADMIN — GADOBUTROL 12.5 ML: 604.72 INJECTION INTRAVENOUS at 09:27

## 2025-03-10 ENCOUNTER — TELEPHONE (OUTPATIENT)
Dept: FAMILY MEDICINE | Facility: CLINIC | Age: 25
End: 2025-03-10
Payer: COMMERCIAL

## 2025-03-10 NOTE — TELEPHONE ENCOUNTER
----- Message from Sandra Vang sent at 3/9/2025 10:07 PM EDT -----  Please let patient know that her MRI showed a possible 3 mm pituitary microadenoma.  She should continue to follow-up with her endocrinologist as previously discussed.  TY!

## 2025-03-10 NOTE — TELEPHONE ENCOUNTER
Called pt to inform her of MRI results and plan of care. Pt was agreeable and provided the referral number for her endocrinologist.

## 2025-03-31 ENCOUNTER — OFFICE VISIT (OUTPATIENT)
Dept: FAMILY MEDICINE | Facility: CLINIC | Age: 25
End: 2025-03-31
Payer: COMMERCIAL

## 2025-03-31 ENCOUNTER — DOCUMENTATION (OUTPATIENT)
Dept: FAMILY MEDICINE | Facility: CLINIC | Age: 25
End: 2025-03-31

## 2025-03-31 VITALS
HEART RATE: 105 BPM | BODY MASS INDEX: 44.41 KG/M2 | SYSTOLIC BLOOD PRESSURE: 124 MMHG | RESPIRATION RATE: 12 BRPM | OXYGEN SATURATION: 98 % | HEIGHT: 68 IN | WEIGHT: 293 LBS | TEMPERATURE: 97.8 F | DIASTOLIC BLOOD PRESSURE: 80 MMHG

## 2025-03-31 DIAGNOSIS — E22.1 HYPERPROLACTINEMIA (CMS/HCC): Primary | ICD-10-CM

## 2025-03-31 DIAGNOSIS — D35.2 PITUITARY ADENOMA (CMS/HCC): ICD-10-CM

## 2025-03-31 PROCEDURE — 99215 OFFICE O/P EST HI 40 MIN: CPT | Performed by: NURSE PRACTITIONER

## 2025-03-31 PROCEDURE — 3008F BODY MASS INDEX DOCD: CPT | Performed by: NURSE PRACTITIONER

## 2025-03-31 RX ORDER — CABERGOLINE 0.5 MG/1
0.5 TABLET ORAL 2 TIMES WEEKLY
Qty: 24 TABLET | Refills: 0 | Status: SHIPPED | OUTPATIENT
Start: 2025-03-31 | End: 2025-06-23

## 2025-03-31 RX ORDER — ONDANSETRON 8 MG/1
8 TABLET, ORALLY DISINTEGRATING ORAL EVERY 8 HOURS PRN
Qty: 10 TABLET | Refills: 1 | Status: SHIPPED | OUTPATIENT
Start: 2025-03-31 | End: 2025-04-07

## 2025-03-31 ASSESSMENT — ENCOUNTER SYMPTOMS
NAUSEA: 0
SHORTNESS OF BREATH: 0
ENDOCRINE NEGATIVE: 1
SEIZURES: 0
COUGH: 0
ARTHRALGIAS: 0
MYALGIAS: 0
RHINORRHEA: 0
FEVER: 0
FLANK PAIN: 0
ADENOPATHY: 0
CARDIOVASCULAR NEGATIVE: 1
DYSURIA: 0
MUSCULOSKELETAL NEGATIVE: 1
POLYPHAGIA: 0
PALPITATIONS: 0
CONSTITUTIONAL NEGATIVE: 1
DIZZINESS: 1
DIARRHEA: 0
EYE DISCHARGE: 0
SORE THROAT: 0
PSYCHIATRIC NEGATIVE: 1
EYE REDNESS: 0
FATIGUE: 0
GASTROINTESTINAL NEGATIVE: 1
POLYDIPSIA: 0
WHEEZING: 0
RESPIRATORY NEGATIVE: 1
NECK PAIN: 0
CONFUSION: 0
HEMATOLOGIC/LYMPHATIC NEGATIVE: 1
APPETITE CHANGE: 0
EYES NEGATIVE: 1
FREQUENCY: 0
CHEST TIGHTNESS: 0
ALLERGIC/IMMUNOLOGIC NEGATIVE: 1
ABDOMINAL PAIN: 0
HEADACHES: 1
CONSTIPATION: 0

## 2025-03-31 NOTE — ASSESSMENT & PLAN NOTE
Since pt has been treated with cabergoline in the past, can't get an appt with Endo until July, & pt is symptomatic, I am willing to start pt on cabergoline twice a week.  Discussed with pt that if she develops any side effects or intolerance, she will need to stop cabergoline and wait to be seen by Endo.  Pt encouraged to call to get an earlier appt if possible.  Rx for zofran given to take as needed.  Lab slip given to check prolactin level in 3 months.

## 2025-03-31 NOTE — PROGRESS NOTES
Maris Avendano MA has completed a chart review for Cintia Nieto.    Care Gap Source: [facility] - QIPS    Care Gap(s) Identified: Cervical Cancer Screening    Chart Review Completed: Yes            Pt appears on QIPS as being due for cervical cancer screening. Pt did see Dr. Escobedo on 9/23/24 and per notes, pt and ob/gyn aware pt is due for pap smear however pt has had a traumatic experience so holding off on screening right now. No outreach provided at this time.

## 2025-03-31 NOTE — PROGRESS NOTES
[facility] at [facility]     Reason for visit:   Chief Complaint   Patient presents with    Follow-up     MRI Results       HPI  Cintia Nieto is a 25 y.o. female    Pt has hx of Pituitary Adenoma and prolactin level was elevated in January.  She called her Endocrinologist to make an appt, but they requested for her to have a MRI first. Her MRI on 3/8 showed a possible 3 mm pituitary microadenoma.     Endocrinologist is Dr Almonte at [place] and told that she can't get an appt until July and was suggested to see her PCP.  Was first treated for hyperprolactinemia on 12/19/2017 with cabergoline 0.5mg twice a week and again in 2022.  Would have nausea with doses and took zofran as needed.  Pt is symptomatic right now with amenorrhea & dizziness/headaches. Doesn't want to wait until July to start medication.         Review of Systems   Constitutional: Negative.  Negative for appetite change, fatigue and fever.   HENT: Negative.  Negative for congestion, ear pain, rhinorrhea and sore throat.    Eyes: Negative.  Negative for discharge, redness and visual disturbance.   Respiratory: Negative.  Negative for cough, chest tightness, shortness of breath and wheezing.    Cardiovascular: Negative.  Negative for chest pain, palpitations and leg swelling.   Gastrointestinal: Negative.  Negative for abdominal pain, constipation, diarrhea and nausea.   Endocrine: Negative.  Negative for polydipsia, polyphagia and polyuria.   Genitourinary: Negative.  Negative for dysuria, flank pain, frequency and urgency.   Musculoskeletal: Negative.  Negative for arthralgias, myalgias and neck pain.   Skin: Negative.  Negative for pallor and rash.   Allergic/Immunologic: Negative.  Negative for immunocompromised state.   Neurological:  Positive for dizziness and headaches. Negative for seizures.   Hematological: Negative.  Negative for adenopathy.   Psychiatric/Behavioral: Negative.  Negative for behavioral problems and  confusion.         Patient Active Problem List   Diagnosis    Acne    Hyperprolactinemia (CMS/HCC)    Morbid obesity with body mass index (BMI) of 40.0 to 44.9 in adult (CMS/HCC)    Pituitary adenoma (CMS/HCC)    Acute non-recurrent pansinusitis    Cervical strain    Vertigo    Anxiety         Past Medical History:   Diagnosis Date    PCOS (polycystic ovarian syndrome)      No past surgical history on file.  Social History     Socioeconomic History    Marital status: Single     Spouse name: Not on file    Number of children: Not on file    Years of education: Not on file    Highest education level: Not on file   Occupational History    Not on file   Tobacco Use    Smoking status: Never    Smokeless tobacco: Never   Substance and Sexual Activity    Alcohol use: Yes     Alcohol/week: 2.0 - 3.0 standard drinks of alcohol     Types: 2 - 3 Shots of liquor per week     Comment: socailly -- hard seltzer     Drug use: Not on file    Sexual activity: Not on file   Other Topics Concern    Not on file   Social History Narrative    Not on file     Social Drivers of Health     Financial Resource Strain: Not on file   Food Insecurity: No Food Insecurity (1/8/2025)    Hunger Vital Sign     Worried About Running Out of Food in the Last Year: Never true     Ran Out of Food in the Last Year: Never true   Transportation Needs: Not on file   Physical Activity: Not on file   Stress: Not on file   Social Connections: Not on file   Intimate Partner Violence: Not on file   Housing Stability: Not on file     Family History   Problem Relation Name Age of Onset    Anxiety disorder Biological Father      Prostate cancer Biological Father      Skin cancer Biological Father      Ventricular tachycardia Biological Sister         Allergies:  Patient has no known allergies.    Current Outpatient Medications   Medication Sig Dispense Refill    cabergoline (DOSTINEX) 0.5 mg tablet Take 1 tablet (0.5 mg total) by mouth 2 (two) times a week (Mon, Thu).  "24 tablet 0    ondansetron ODT (ZOFRAN-ODT) 8 mg disintegrating tablet Take 1 tablet (8 mg total) by mouth every 8 (eight) hours as needed for nausea or vomiting for up to 7 days. 10 tablet 1     No current facility-administered medications for this visit.        Objective   Vitals:    03/31/25 0938   BP: 124/80   Pulse: (!) 105   Resp: 12   Temp: 36.6 °C (97.8 °F)   SpO2: 98%   Weight: (!) 140 kg (309 lb)   Height: 1.727 m (5' 8\")     Ht Readings from Last 3 Encounters:   03/31/25 1.727 m (5' 8\")   03/08/25 1.727 m (5' 8\")   01/28/25 1.676 m (5' 6\")     Wt Readings from Last 3 Encounters:   03/31/25 (!) 140 kg (309 lb)   03/08/25 127 kg (280 lb)   01/28/25 136 kg (299 lb)     Body mass index is 46.98 kg/m².    Physical Exam  Constitutional:       Appearance: She is well-developed.   Cardiovascular:      Rate and Rhythm: Normal rate and regular rhythm.      Heart sounds: Normal heart sounds, S1 normal and S2 normal.   Pulmonary:      Effort: Pulmonary effort is normal.      Breath sounds: Normal breath sounds.   Neurological:      Mental Status: She is alert and oriented to person, place, and time.   Psychiatric:         Speech: Speech normal.         Behavior: Behavior is cooperative.         Point of Care Testing Results  No results found for this visit on 03/31/25.     Assessment   Diagnoses and all orders for this visit:    Hyperprolactinemia (CMS/HCC) (Primary)  Assessment & Plan:  Since pt has been treated with cabergoline in the past, can't get an appt with Endo until July, & pt is symptomatic, I am willing to start pt on cabergoline twice a week.  Discussed with pt that if she develops any side effects or intolerance, she will need to stop cabergoline and wait to be seen by Endo.  Pt encouraged to call to get an earlier appt if possible.  Rx for zofran given to take as needed.  Lab slip given to check prolactin level in 3 months.     Orders:  -     cabergoline (DOSTINEX) 0.5 mg tablet; Take 1 tablet (0.5 mg " total) by mouth 2 (two) times a week (Mon, Thu).  -     ondansetron ODT (ZOFRAN-ODT) 8 mg disintegrating tablet; Take 1 tablet (8 mg total) by mouth every 8 (eight) hours as needed for nausea or vomiting for up to 7 days.  -     Prolactin; Future    Pituitary adenoma (CMS/HCC)  Assessment & Plan:  Pt to establish care with Endocrinology as soon as she is able.     Orders:  -     cabergoline (DOSTINEX) 0.5 mg tablet; Take 1 tablet (0.5 mg total) by mouth 2 (two) times a week (Mon, Thu).  -     ondansetron ODT (ZOFRAN-ODT) 8 mg disintegrating tablet; Take 1 tablet (8 mg total) by mouth every 8 (eight) hours as needed for nausea or vomiting for up to 7 days.        I spent  45 minutes on this date of service performing the following activities: obtaining history, entering orders, documenting, preparing for visit, and providing counseling and education.     Patient has been educated on the risks, benefits, and side effects of all medication prescribed at this visit, and will contact me with any further questions.  Patient expressed understanding and agreement with plan.  Return in about 3 months (around 6/30/2025).    ARNULFO Villarreal  3/31/2025       There are no Patient Instructions on file for this visit.

## 2025-07-09 DIAGNOSIS — E22.1 HYPERPROLACTINEMIA (CMS/HCC): ICD-10-CM

## 2025-07-09 DIAGNOSIS — D35.2 PITUITARY ADENOMA (CMS/HCC): ICD-10-CM

## 2025-07-09 RX ORDER — CABERGOLINE 0.5 MG/1
0.5 TABLET ORAL 2 TIMES WEEKLY
Qty: 24 TABLET | Refills: 0 | Status: SHIPPED | OUTPATIENT
Start: 2025-07-10 | End: 2025-10-02

## 2025-07-09 NOTE — TELEPHONE ENCOUNTER
Left voicemail for pt letting her know Dr. Gomez sent refill to her pharmacy, however pt needs to have a discussion about the situation w/ Dr. Gomez at appt on Friday.

## 2025-07-09 NOTE — TELEPHONE ENCOUNTER
Please let her know that I sent a refill to her pharmacy but we need to discuss the situation at her appt this Friday. I thought she had an appt to see her endocrinologist in July (per Sandra Vang's note)

## 2025-07-09 NOTE — TELEPHONE ENCOUNTER
Medicine Refill Request    Last Office Visit: 3/31/2025   Last Consult Visit: Visit date not found  Last Telemedicine Visit: Visit date not found    Next Appointment: 7/11/2025      Current Outpatient Medications:     cabergoline (DOSTINEX) 0.5 mg tablet, Take 1 tablet (0.5 mg total) by mouth 2 (two) times a week (Mon, Thu)., Disp: 24 tablet, Rfl: 0    ondansetron ODT (ZOFRAN-ODT) 8 mg disintegrating tablet, Take 1 tablet (8 mg total) by mouth every 8 (eight) hours as needed for nausea or vomiting for up to 7 days., Disp: 10 tablet, Rfl: 1    BP Readings from Last 3 Encounters:   03/31/25 124/80   01/28/25 118/80   01/13/25 118/82       Recent Lab results:  Lab Results   Component Value Date    CHOL 168 01/28/2025   ,   Lab Results   Component Value Date    HDL 42 01/28/2025   ,   Lab Results   Component Value Date    LDLCALC 108 (H) 01/28/2025   ,   Lab Results   Component Value Date    TRIG 96 01/28/2025        Lab Results   Component Value Date    GLUCOSE 94 01/28/2025   ,   Lab Results   Component Value Date    HGBA1C 4.9 08/22/2016         Lab Results   Component Value Date    CREATININE 0.77 01/28/2025       Lab Results   Component Value Date    TSH 2.420 01/28/2025           Lab Results   Component Value Date    HGBA1C 4.9 08/22/2016

## 2025-07-11 ENCOUNTER — OFFICE VISIT (OUTPATIENT)
Dept: FAMILY MEDICINE | Facility: CLINIC | Age: 25
End: 2025-07-11
Payer: COMMERCIAL

## 2025-07-11 VITALS
HEART RATE: 84 BPM | SYSTOLIC BLOOD PRESSURE: 122 MMHG | OXYGEN SATURATION: 98 % | BODY MASS INDEX: 46.98 KG/M2 | TEMPERATURE: 98.3 F | DIASTOLIC BLOOD PRESSURE: 78 MMHG | RESPIRATION RATE: 14 BRPM | WEIGHT: 293 LBS

## 2025-07-11 DIAGNOSIS — E22.1 HYPERPROLACTINEMIA (CMS/HCC): Primary | ICD-10-CM

## 2025-07-11 PROBLEM — J01.40 ACUTE NON-RECURRENT PANSINUSITIS: Status: RESOLVED | Noted: 2025-01-14 | Resolved: 2025-07-11

## 2025-07-11 PROCEDURE — 3008F BODY MASS INDEX DOCD: CPT | Performed by: FAMILY MEDICINE

## 2025-07-11 PROCEDURE — 99213 OFFICE O/P EST LOW 20 MIN: CPT | Performed by: FAMILY MEDICINE

## 2025-07-11 ASSESSMENT — ENCOUNTER SYMPTOMS
ABDOMINAL PAIN: 1
HEMATURIA: 0
SORE THROAT: 0
SHORTNESS OF BREATH: 0
MYALGIAS: 0
FEVER: 0
HEADACHES: 0
DYSURIA: 0

## 2025-07-11 NOTE — ASSESSMENT & PLAN NOTE
Continue cabergoline  and check prolactin level To endocrinology (gave contact information for hospitals endocrinology to see if she can get an appt sooner that December)

## 2025-07-11 NOTE — PROGRESS NOTES
[facility] at [facility]     Reason for visit:   Chief Complaint   Patient presents with    Follow-up      HPI  Hyperprolactinemia (has pituitary adenoma): she went off cabergoline several years ago and was doing well. Last saw her endocrinologist about 4 years ago. (Dr Swathi Almonte). Then early this year developed headaches, dizziness and started skipping her periods again. Her prolactin was elevated again. She was started on cabergoline to cover her until she can get in to see endocrinology. Since startingit she is feeling better. She has been able to get an appt to establish with Dr Ryan in December.      Cintia Nieto is a 25 y.o. female      The following have been reviewed and updated as appropriate in this visit  Patient Active Problem List    Diagnosis Date Noted    Anxiety 01/28/2025    Cervical strain 01/14/2025    Vertigo 01/14/2025    Pituitary adenoma (CMS/[facility]) 02/07/2023    Acne 06/13/2019    Hyperprolactinemia (CMS/[facility]) 06/13/2019    Morbid obesity with body mass index (BMI) of 40.0 to 44.9 in adult (CMS/[facility]) 06/13/2019     Past Medical History:   Diagnosis Date    PCOS (polycystic ovarian syndrome)      No past surgical history on file.  Social History     Socioeconomic History    Marital status: Single     Spouse name: Not on file    Number of children: Not on file    Years of education: Not on file    Highest education level: Not on file   Occupational History    Not on file   Tobacco Use    Smoking status: Never    Smokeless tobacco: Never   Substance and Sexual Activity    Alcohol use: Yes     Alcohol/week: 2.0 - 3.0 standard drinks of alcohol     Types: 2 - 3 Shots of liquor per week     Comment: socailly -- hard seltzer     Drug use: Not on file    Sexual activity: Not on file   Other Topics Concern    Not on file   Social History Narrative    Not on file     Social Drivers of Health     Financial Resource Strain: Not on file   Food Insecurity: No Food Insecurity  (1/8/2025)    Hunger Vital Sign     Worried About Running Out of Food in the Last Year: Never true     Ran Out of Food in the Last Year: Never true   Transportation Needs: Not on file   Physical Activity: Not on file   Stress: Not on file   Social Connections: Not on file   Intimate Partner Violence: Not on file   Housing Stability: Not on file       Family History   Problem Relation Name Age of Onset    Anxiety disorder Biological Father      Prostate cancer Biological Father      Skin cancer Biological Father      Ventricular tachycardia Biological Sister         No Known Allergies    Current Outpatient Medications   Medication Sig Dispense Refill    cabergoline (DOSTINEX) 0.5 mg tablet Take 1 tablet (0.5 mg total) by mouth 2 (two) times a week (Mon, Thu). 24 tablet 0    ondansetron ODT (ZOFRAN-ODT) 8 mg disintegrating tablet Take 1 tablet (8 mg total) by mouth every 8 (eight) hours as needed for nausea or vomiting for up to 7 days. 10 tablet 1     No current facility-administered medications for this visit.     Review of Systems   Constitutional:  Negative for fever.   HENT:  Negative for ear pain and sore throat.    Respiratory:  Negative for shortness of breath.    Cardiovascular:  Negative for chest pain.   Gastrointestinal:  Positive for abdominal pain (with ovulation.).   Genitourinary:  Negative for dysuria and hematuria.   Musculoskeletal:  Negative for myalgias.   Skin:  Negative for rash.   Neurological:  Negative for headaches.     Objective   Vitals:    07/11/25 1019   BP: 122/78   Pulse: 84   Resp: 14   Temp: 36.8 °C (98.3 °F)   SpO2: 98%   Weight: (!) 140 kg (309 lb)     Body mass index is 46.98 kg/m².    Physical Exam  Constitutional:       General: She is not in acute distress.     Appearance: She is well-developed.   HENT:      Head: Normocephalic and atraumatic.      Right Ear: Tympanic membrane and ear canal normal.      Left Ear: Tympanic membrane and ear canal normal.   Eyes:      General:  Lids are normal.      Conjunctiva/sclera: Conjunctivae normal.      Pupils: Pupils are equal, round, and reactive to light.   Neck:      Thyroid: No thyromegaly.   Cardiovascular:      Rate and Rhythm: Normal rate and regular rhythm.      Heart sounds: Normal heart sounds. No murmur heard.     No friction rub. No gallop.   Pulmonary:      Effort: Pulmonary effort is normal.      Breath sounds: Normal breath sounds.   Abdominal:      Palpations: Abdomen is soft.      Tenderness: There is no abdominal tenderness.   Musculoskeletal:      Right lower leg: No edema.      Left lower leg: No edema.   Lymphadenopathy:      Cervical: No cervical adenopathy.   Skin:     Coloration: Skin is not cyanotic.      Nails: There is no clubbing.   Neurological:      Mental Status: She is alert.       Procedures       POINT OF CARE TESTING:    No results found for this visit on 07/11/25.     Assessment   Diagnoses and all orders for this visit:    Hyperprolactinemia (CMS/HCC) (Primary)  Assessment & Plan:  Continue cabergoline  and check prolactin level To endocrinology (gave contact information for [facility] endocrinology to see if she can get an appt sooner that December)    Orders:  -     Prolactin; Future         Educated patient on any new medications/doses that I prescribed today and patient expressed understanding and patient expressed understanding of and agreement with plan  No follow-ups on file.     Anne Marie Gomez MD  7/11/2025

## 2025-08-15 ENCOUNTER — HOSPITAL ENCOUNTER (OUTPATIENT)
Facility: CLINIC | Age: 25
Discharge: HOME | End: 2025-08-15
Attending: FAMILY MEDICINE
Payer: COMMERCIAL

## 2025-08-15 ENCOUNTER — NURSE TRIAGE (OUTPATIENT)
Dept: FAMILY MEDICINE | Facility: CLINIC | Age: 25
End: 2025-08-15
Payer: COMMERCIAL

## 2025-08-15 VITALS
TEMPERATURE: 97.9 F | SYSTOLIC BLOOD PRESSURE: 118 MMHG | HEART RATE: 94 BPM | BODY MASS INDEX: 44.41 KG/M2 | WEIGHT: 293 LBS | HEIGHT: 68 IN | DIASTOLIC BLOOD PRESSURE: 80 MMHG | OXYGEN SATURATION: 100 %

## 2025-08-15 DIAGNOSIS — R21 RASH: Primary | ICD-10-CM

## 2025-08-15 PROCEDURE — S9083 URGENT CARE CENTER GLOBAL: HCPCS | Performed by: FAMILY MEDICINE

## 2025-08-15 PROCEDURE — 99213 OFFICE O/P EST LOW 20 MIN: CPT | Performed by: FAMILY MEDICINE

## 2025-08-15 RX ORDER — CEPHALEXIN 500 MG/1
500 CAPSULE ORAL 4 TIMES DAILY
Qty: 28 CAPSULE | Refills: 0 | Status: SHIPPED | OUTPATIENT
Start: 2025-08-15 | End: 2025-08-22

## 2025-08-15 RX ORDER — PREDNISONE 20 MG/1
40 TABLET ORAL DAILY
Qty: 6 TABLET | Refills: 0 | Status: SHIPPED | OUTPATIENT
Start: 2025-08-15 | End: 2025-08-18